# Patient Record
Sex: MALE | Race: WHITE | Employment: OTHER | URBAN - METROPOLITAN AREA
[De-identification: names, ages, dates, MRNs, and addresses within clinical notes are randomized per-mention and may not be internally consistent; named-entity substitution may affect disease eponyms.]

---

## 2020-04-02 ENCOUNTER — HOSPITAL ENCOUNTER (INPATIENT)
Age: 65
LOS: 7 days | Discharge: NURSING, REHAB OR LONG-TERM CARE | DRG: 897 | End: 2020-04-09
Attending: EMERGENCY MEDICINE | Admitting: INTERNAL MEDICINE
Payer: COMMERCIAL

## 2020-04-02 PROBLEM — E83.39 HYPOPHOSPHATEMIA: Status: ACTIVE | Noted: 2020-04-02

## 2020-04-02 PROBLEM — R25.1 TREMOR: Status: ACTIVE | Noted: 2020-04-02

## 2020-04-02 PROBLEM — F10.930 ALCOHOL WITHDRAWAL, UNCOMPLICATED (HCC): Status: ACTIVE | Noted: 2020-04-02

## 2020-04-02 PROBLEM — I10 ESSENTIAL HYPERTENSION: Status: ACTIVE | Noted: 2020-04-02

## 2020-04-02 LAB
A/G RATIO: 1.2 (ref 1.1–2.2)
ALBUMIN SERPL-MCNC: 4.3 G/DL (ref 3.4–5)
ALP BLD-CCNC: 44 U/L (ref 40–129)
ALT SERPL-CCNC: 24 U/L (ref 10–40)
ANION GAP SERPL CALCULATED.3IONS-SCNC: 18 MMOL/L (ref 3–16)
AST SERPL-CCNC: 34 U/L (ref 15–37)
BASOPHILS ABSOLUTE: 0 K/UL (ref 0–0.2)
BASOPHILS RELATIVE PERCENT: 0.2 %
BILIRUB SERPL-MCNC: 0.9 MG/DL (ref 0–1)
BUN BLDV-MCNC: 8 MG/DL (ref 7–20)
CALCIUM SERPL-MCNC: 9.4 MG/DL (ref 8.3–10.6)
CHLORIDE BLD-SCNC: 99 MMOL/L (ref 99–110)
CO2: 23 MMOL/L (ref 21–32)
CREAT SERPL-MCNC: <0.5 MG/DL (ref 0.8–1.3)
EOSINOPHILS ABSOLUTE: 0 K/UL (ref 0–0.6)
EOSINOPHILS RELATIVE PERCENT: 0.1 %
ETHANOL: 101 MG/DL (ref 0–0.08)
GFR AFRICAN AMERICAN: >60
GFR NON-AFRICAN AMERICAN: >60
GLOBULIN: 3.5 G/DL
GLUCOSE BLD-MCNC: 136 MG/DL (ref 70–99)
HCT VFR BLD CALC: 44.9 % (ref 40.5–52.5)
HEMOGLOBIN: 15.5 G/DL (ref 13.5–17.5)
INR BLD: 0.95 (ref 0.86–1.14)
LYMPHOCYTES ABSOLUTE: 1.9 K/UL (ref 1–5.1)
LYMPHOCYTES RELATIVE PERCENT: 26.6 %
MAGNESIUM: 2.1 MG/DL (ref 1.8–2.4)
MCH RBC QN AUTO: 35.4 PG (ref 26–34)
MCHC RBC AUTO-ENTMCNC: 34.5 G/DL (ref 31–36)
MCV RBC AUTO: 102.7 FL (ref 80–100)
MONOCYTES ABSOLUTE: 0.7 K/UL (ref 0–1.3)
MONOCYTES RELATIVE PERCENT: 9.2 %
NEUTROPHILS ABSOLUTE: 4.6 K/UL (ref 1.7–7.7)
NEUTROPHILS RELATIVE PERCENT: 63.9 %
PDW BLD-RTO: 13.8 % (ref 12.4–15.4)
PHOSPHORUS: 2.1 MG/DL (ref 2.5–4.9)
PLATELET # BLD: 136 K/UL (ref 135–450)
PMV BLD AUTO: 7.4 FL (ref 5–10.5)
POTASSIUM REFLEX MAGNESIUM: 3.8 MMOL/L (ref 3.5–5.1)
PROTHROMBIN TIME: 11 SEC (ref 10–13.2)
RBC # BLD: 4.37 M/UL (ref 4.2–5.9)
SODIUM BLD-SCNC: 140 MMOL/L (ref 136–145)
TOTAL PROTEIN: 7.8 G/DL (ref 6.4–8.2)
WBC # BLD: 7.3 K/UL (ref 4–11)

## 2020-04-02 PROCEDURE — 84100 ASSAY OF PHOSPHORUS: CPT

## 2020-04-02 PROCEDURE — 85025 COMPLETE CBC W/AUTO DIFF WBC: CPT

## 2020-04-02 PROCEDURE — 6360000002 HC RX W HCPCS: Performed by: NURSE PRACTITIONER

## 2020-04-02 PROCEDURE — 2580000003 HC RX 258: Performed by: INTERNAL MEDICINE

## 2020-04-02 PROCEDURE — 2500000003 HC RX 250 WO HCPCS: Performed by: NURSE PRACTITIONER

## 2020-04-02 PROCEDURE — 96365 THER/PROPH/DIAG IV INF INIT: CPT

## 2020-04-02 PROCEDURE — 6370000000 HC RX 637 (ALT 250 FOR IP): Performed by: INTERNAL MEDICINE

## 2020-04-02 PROCEDURE — 96366 THER/PROPH/DIAG IV INF ADDON: CPT

## 2020-04-02 PROCEDURE — 85610 PROTHROMBIN TIME: CPT

## 2020-04-02 PROCEDURE — 99285 EMERGENCY DEPT VISIT HI MDM: CPT

## 2020-04-02 PROCEDURE — 6360000002 HC RX W HCPCS: Performed by: EMERGENCY MEDICINE

## 2020-04-02 PROCEDURE — G0480 DRUG TEST DEF 1-7 CLASSES: HCPCS

## 2020-04-02 PROCEDURE — 6370000000 HC RX 637 (ALT 250 FOR IP): Performed by: NURSE PRACTITIONER

## 2020-04-02 PROCEDURE — 2580000003 HC RX 258: Performed by: NURSE PRACTITIONER

## 2020-04-02 PROCEDURE — 2060000000 HC ICU INTERMEDIATE R&B

## 2020-04-02 PROCEDURE — 2580000003 HC RX 258: Performed by: EMERGENCY MEDICINE

## 2020-04-02 PROCEDURE — 6370000000 HC RX 637 (ALT 250 FOR IP): Performed by: EMERGENCY MEDICINE

## 2020-04-02 PROCEDURE — 80053 COMPREHEN METABOLIC PANEL: CPT

## 2020-04-02 PROCEDURE — 83735 ASSAY OF MAGNESIUM: CPT

## 2020-04-02 RX ORDER — CHLORDIAZEPOXIDE HYDROCHLORIDE 25 MG/1
50 CAPSULE, GELATIN COATED ORAL 3 TIMES DAILY
Status: DISCONTINUED | OUTPATIENT
Start: 2020-04-02 | End: 2020-04-04

## 2020-04-02 RX ORDER — DIAZEPAM 5 MG/1
5 TABLET ORAL EVERY 4 HOURS PRN
Status: DISCONTINUED | OUTPATIENT
Start: 2020-04-02 | End: 2020-04-03

## 2020-04-02 RX ORDER — THIAMINE MONONITRATE (VIT B1) 100 MG
100 TABLET ORAL DAILY
Status: DISCONTINUED | OUTPATIENT
Start: 2020-04-03 | End: 2020-04-03 | Stop reason: DRUGHIGH

## 2020-04-02 RX ORDER — FOLIC ACID 1 MG/1
1 TABLET ORAL DAILY
Status: DISCONTINUED | OUTPATIENT
Start: 2020-04-03 | End: 2020-04-09 | Stop reason: HOSPADM

## 2020-04-02 RX ORDER — PROMETHAZINE HYDROCHLORIDE 25 MG/1
12.5 TABLET ORAL EVERY 6 HOURS PRN
Status: DISCONTINUED | OUTPATIENT
Start: 2020-04-02 | End: 2020-04-09 | Stop reason: HOSPADM

## 2020-04-02 RX ORDER — LISINOPRIL 20 MG/1
20 TABLET ORAL DAILY
Status: DISCONTINUED | OUTPATIENT
Start: 2020-04-03 | End: 2020-04-08

## 2020-04-02 RX ORDER — PHENOBARBITAL SODIUM 130 MG/ML
130 INJECTION INTRAMUSCULAR ONCE
Status: DISCONTINUED | OUTPATIENT
Start: 2020-04-02 | End: 2020-04-02 | Stop reason: SDUPTHER

## 2020-04-02 RX ORDER — ACETAMINOPHEN 325 MG/1
650 TABLET ORAL EVERY 6 HOURS PRN
Status: DISCONTINUED | OUTPATIENT
Start: 2020-04-02 | End: 2020-04-09 | Stop reason: HOSPADM

## 2020-04-02 RX ORDER — TRAZODONE HYDROCHLORIDE 50 MG/1
150 TABLET ORAL NIGHTLY
Status: DISCONTINUED | OUTPATIENT
Start: 2020-04-02 | End: 2020-04-09 | Stop reason: HOSPADM

## 2020-04-02 RX ORDER — SODIUM CHLORIDE 0.9 % (FLUSH) 0.9 %
10 SYRINGE (ML) INJECTION PRN
Status: DISCONTINUED | OUTPATIENT
Start: 2020-04-02 | End: 2020-04-02 | Stop reason: SDUPTHER

## 2020-04-02 RX ORDER — SODIUM CHLORIDE 0.9 % (FLUSH) 0.9 %
10 SYRINGE (ML) INJECTION EVERY 12 HOURS SCHEDULED
Status: DISCONTINUED | OUTPATIENT
Start: 2020-04-02 | End: 2020-04-09 | Stop reason: HOSPADM

## 2020-04-02 RX ORDER — ONDANSETRON 2 MG/ML
4 INJECTION INTRAMUSCULAR; INTRAVENOUS EVERY 6 HOURS PRN
Status: DISCONTINUED | OUTPATIENT
Start: 2020-04-02 | End: 2020-04-09 | Stop reason: HOSPADM

## 2020-04-02 RX ORDER — NICOTINE 21 MG/24HR
1 PATCH, TRANSDERMAL 24 HOURS TRANSDERMAL DAILY
Status: DISCONTINUED | OUTPATIENT
Start: 2020-04-02 | End: 2020-04-02 | Stop reason: CLARIF

## 2020-04-02 RX ORDER — SODIUM CHLORIDE 9 MG/ML
INJECTION, SOLUTION INTRAVENOUS CONTINUOUS
Status: ACTIVE | OUTPATIENT
Start: 2020-04-02 | End: 2020-04-03

## 2020-04-02 RX ORDER — ASPIRIN 81 MG/1
81 TABLET ORAL DAILY
Status: DISCONTINUED | OUTPATIENT
Start: 2020-04-03 | End: 2020-04-09 | Stop reason: HOSPADM

## 2020-04-02 RX ORDER — SODIUM CHLORIDE 0.9 % (FLUSH) 0.9 %
10 SYRINGE (ML) INJECTION PRN
Status: DISCONTINUED | OUTPATIENT
Start: 2020-04-02 | End: 2020-04-09 | Stop reason: HOSPADM

## 2020-04-02 RX ORDER — CLONIDINE HYDROCHLORIDE 0.1 MG/1
0.1 TABLET ORAL 2 TIMES DAILY
Status: DISCONTINUED | OUTPATIENT
Start: 2020-04-02 | End: 2020-04-07

## 2020-04-02 RX ORDER — ACETAMINOPHEN 650 MG/1
650 SUPPOSITORY RECTAL EVERY 6 HOURS PRN
Status: DISCONTINUED | OUTPATIENT
Start: 2020-04-02 | End: 2020-04-09 | Stop reason: HOSPADM

## 2020-04-02 RX ORDER — CHLORDIAZEPOXIDE HYDROCHLORIDE 25 MG/1
25 CAPSULE, GELATIN COATED ORAL ONCE
Status: COMPLETED | OUTPATIENT
Start: 2020-04-02 | End: 2020-04-02

## 2020-04-02 RX ORDER — SODIUM CHLORIDE 0.9 % (FLUSH) 0.9 %
10 SYRINGE (ML) INJECTION EVERY 12 HOURS SCHEDULED
Status: DISCONTINUED | OUTPATIENT
Start: 2020-04-02 | End: 2020-04-02 | Stop reason: SDUPTHER

## 2020-04-02 RX ORDER — LISINOPRIL 20 MG/1
20 TABLET ORAL DAILY
COMMUNITY

## 2020-04-02 RX ORDER — TRAZODONE HYDROCHLORIDE 150 MG/1
150 TABLET ORAL NIGHTLY
COMMUNITY

## 2020-04-02 RX ORDER — MULTIVITAMIN WITH FOLIC ACID 400 MCG
1 TABLET ORAL DAILY
Status: DISCONTINUED | OUTPATIENT
Start: 2020-04-03 | End: 2020-04-09 | Stop reason: HOSPADM

## 2020-04-02 RX ADMIN — CHLORDIAZEPOXIDE HYDROCHLORIDE 25 MG: 25 CAPSULE ORAL at 19:30

## 2020-04-02 RX ADMIN — POTASSIUM PHOSPHATE, MONOBASIC 250 MG: 500 TABLET, SOLUBLE ORAL at 21:38

## 2020-04-02 RX ADMIN — FOLIC ACID: 5 INJECTION, SOLUTION INTRAMUSCULAR; INTRAVENOUS; SUBCUTANEOUS at 17:39

## 2020-04-02 RX ADMIN — DIAZEPAM 5 MG: 5 TABLET ORAL at 21:33

## 2020-04-02 RX ADMIN — SODIUM CHLORIDE: 9 INJECTION, SOLUTION INTRAVENOUS at 21:01

## 2020-04-02 RX ADMIN — Medication 10 ML: at 21:01

## 2020-04-02 RX ADMIN — PROMETHAZINE HYDROCHLORIDE 12.5 MG: 25 TABLET ORAL at 21:33

## 2020-04-02 RX ADMIN — CLONIDINE HYDROCHLORIDE 0.1 MG: 0.1 TABLET ORAL at 21:33

## 2020-04-02 RX ADMIN — PHENOBARBITAL SODIUM 130 MG: 130 INJECTION INTRAMUSCULAR; INTRAVENOUS at 21:35

## 2020-04-02 RX ADMIN — CHLORDIAZEPOXIDE HYDROCHLORIDE 50 MG: 25 CAPSULE ORAL at 17:32

## 2020-04-02 RX ADMIN — TRAZODONE HYDROCHLORIDE 150 MG: 50 TABLET ORAL at 21:33

## 2020-04-02 ASSESSMENT — PAIN DESCRIPTION - INTENSITY: RATING_2: 6

## 2020-04-02 ASSESSMENT — PAIN SCALES - GENERAL
PAINLEVEL_OUTOF10: 9
PAINLEVEL_OUTOF10: 0
PAINLEVEL_OUTOF10: 0

## 2020-04-02 ASSESSMENT — PAIN DESCRIPTION - LOCATION
LOCATION: ABDOMEN
LOCATION_2: HEAD

## 2020-04-02 ASSESSMENT — PAIN DESCRIPTION - DESCRIPTORS: DESCRIPTORS: CRAMPING

## 2020-04-02 ASSESSMENT — PAIN DESCRIPTION - PAIN TYPE
TYPE: ACUTE PAIN
TYPE_2: ACUTE PAIN

## 2020-04-02 NOTE — ED NOTES
Pt to ER from home where he reports he's going through alcohol withdrawal, patient reporting his last drink was 830 am today, patient reports he drinks vodka or chardonnay.  Seizure pads placed on bed because patient reports having seizures in the past due to acute withdrawal.      oRmulo Estrella RN  04/02/20 6382

## 2020-04-02 NOTE — H&P
Provider, MD   traZODone (DESYREL) 150 MG tablet Take 150 mg by mouth nightly   Yes Historical Provider, MD       Allergies:  Ativan [lorazepam] and Morphine    Social History:  The patient currently lives alone    TOBACCO:   reports that he has never smoked. He has never used smokeless tobacco.  ETOH:   reports current alcohol use. Family History:  Reviewed in detail and negative for DM, Early CAD, Cancer, CVA. Positive as follows:    History reviewed. No pertinent family history. REVIEW OF SYSTEMS:   Positive for alcohol withdrawals with tremors, hypertension and as noted in the HPI. All other systems reviewed and negative. PHYSICAL EXAM:    BP (!) 148/81   Pulse 96   Temp 98.1 °F (36.7 °C) (Oral)   Resp 16   Ht 6' 4\" (1.93 m)   Wt 230 lb (104.3 kg)   SpO2 95%   BMI 28.00 kg/m²     General appearance: No apparent distress appears stated age and cooperative. HEENT Normal cephalic, atraumatic without obvious deformity. Pupils equal, round, and reactive to light. Extra ocular muscles intact. Conjunctivae/corneas clear. Neck: Supple, No jugular venous distention/bruits. Lungs: Clear to auscultation, bilaterally without Rales/Wheezes/Rhonchi with good respiratory effort. Heart: Regular rate and rhythm with Normal S1/S2 without murmurs, rubs or gallops, point of maximum impulse non-displaced  Abdomen: Soft, non-tender or non-distended without rigidity or guarding and positive bowel sounds all four quadrants. Extremities: No clubbing, cyanosis, or edema bilaterally. Full range of motion without deformity and normal gait intact. Skin: Skin color, texture, turgor normal.  No rashes or lesions. Neurologic: Alert and oriented X 3, neurovascularly intact with sensory/motor intact upper extremities/lower extremities, bilaterally. Cranial nerves: II-XII intact, grossly non-focal.  Asterixis  Mental status: Alert, oriented, thought content appropriate.         CBC   Recent Labs     04/02/20  1728

## 2020-04-02 NOTE — ED PROVIDER NOTES
CONSULT TO HOSPITALIST      EMERGENCY DEPARTMENT COURSE and DIFFERENTIAL DIAGNOSIS/MDM:   Vitals:    Vitals:    04/03/20 1041 04/03/20 1056 04/03/20 1106 04/03/20 1200   BP: 126/79 123/81 131/86 126/79   Pulse: 97 98 93 92   Resp: 16 16 16 18   Temp:    97.9 °F (36.6 °C)   TempSrc:    Oral   SpO2: 95% 95% 95% 96%   Weight:       Height:           Patient was given the following medications:  Medications   chlordiazePOXIDE (LIBRIUM) capsule 50 mg (50 mg Oral Given 4/3/20 0931)   lisinopril (PRINIVIL;ZESTRIL) tablet 20 mg (20 mg Oral Given 4/3/20 0923)   aspirin EC tablet 81 mg (81 mg Oral Given 4/3/20 0924)   traZODone (DESYREL) tablet 150 mg (150 mg Oral Given 4/2/20 2133)   sodium chloride flush 0.9 % injection 10 mL (10 mLs Intravenous Given 4/3/20 0923)   sodium chloride flush 0.9 % injection 10 mL (has no administration in time range)   acetaminophen (TYLENOL) tablet 650 mg (has no administration in time range)     Or   acetaminophen (TYLENOL) suppository 650 mg (has no administration in time range)   promethazine (PHENERGAN) tablet 12.5 mg (12.5 mg Oral Given 4/2/20 2133)     Or   ondansetron (ZOFRAN) injection 4 mg ( Intravenous See Alternative 4/2/20 2133)   enoxaparin (LOVENOX) injection 40 mg (40 mg Subcutaneous Given 6/2/75 4094)   folic acid (FOLVITE) tablet 1 mg (1 mg Oral Given 4/3/20 0923)   multivitamin 1 tablet (1 tablet Oral Given 4/3/20 0923)   0.9 % sodium chloride infusion ( Intravenous Canceled Entry 4/2/20 2133)   cloNIDine (CATAPRES) tablet 0.1 mg (0.1 mg Oral Given 4/3/20 0924)   potassium phosphate (monobasic) (K-PHOS) tablet 250 mg (250 mg Oral Given 4/3/20 0924)   diazePAM (VALIUM) tablet 10 mg (has no administration in time range)   thiamine (B-1) 300 mg in sodium chloride 0.9 % 100 mL IVPB (300 mg Intravenous New Bag 4/3/20 1227)     Followed by   vitamin B-1 (THIAMINE) tablet 100 mg (100 mg Oral Given 4/3/20 5748)   sodium chloride 0.9 % 3,478 mL with folic acid 1 mg, adult multi-vitamin with vitamin k 10 mL, thiamine 300 mg ( Intravenous Stopped 4/1956)   chlordiazePOXIDE (LIBRIUM) capsule 25 mg (25 mg Oral Given 4/2/20 1930)   PHENobarbital (LUMINAL) 130 mg in sodium chloride 0.9 % 100 mL IVPB (0 mg Intravenous Stopped 4/3/20 0039)   PHENobarbital (LUMINAL) 130 mg in sodium chloride 0.9 % 100 mL IVPB (0 mg Intravenous Stopped 4/3/20 1107)       Briefly, this is a 62year old male that  presents to the emergency department today with acute alcohol withdrawal.  The patient reports that he was sent over from [facility] for concern of alcohol withdrawal with history of withdrawal seizure. The patient reports he last drank this morning. States that he generally drinks 4 tall vodka sodas each day. He is tremulous, states that he is nauseated with some diarrhea body aches headache and increased sensorium. History of Ativan allergy, states it causes severe nausea with vomiting. He will be given Librium p.o. I did call pharmacy to dose this. Plan for admission for acute alcohol withdrawal.  Hospitalist will speak with attending physician once labs are resulted as his visit does exceed the length of my shift. FINAL IMPRESSION      1. Acute alcohol abuse, with unspecified complication ([address]    2. History of seizures    3. Hypophosphatemia          DISPOSITION/PLAN   DISPOSITION Admitted 04/02/2020 07:27:16 PM      PATIENT REFERREDTO:  No follow-up provider specified.     DISCHARGE MEDICATIONS:  Current Discharge Medication List          DISCONTINUED MEDICATIONS:  Current Discharge Medication List                 (Please note that portions of this note were completed with a voice recognition program.  Efforts were made to edit the dictations but occasionally words are mis-transcribed.)    NICANOR Hardy CNP (electronically signed)           NICANOR Hardy CNP  04/04/20 9115

## 2020-04-03 LAB
AMPHETAMINE SCREEN, URINE: ABNORMAL
ANION GAP SERPL CALCULATED.3IONS-SCNC: 10 MMOL/L (ref 3–16)
BARBITURATE SCREEN URINE: POSITIVE
BENZODIAZEPINE SCREEN, URINE: POSITIVE
BUN BLDV-MCNC: 8 MG/DL (ref 7–20)
CALCIUM SERPL-MCNC: 8.4 MG/DL (ref 8.3–10.6)
CANNABINOID SCREEN URINE: ABNORMAL
CHLORIDE BLD-SCNC: 105 MMOL/L (ref 99–110)
CO2: 25 MMOL/L (ref 21–32)
COCAINE METABOLITE SCREEN URINE: ABNORMAL
CREAT SERPL-MCNC: <0.5 MG/DL (ref 0.8–1.3)
GFR AFRICAN AMERICAN: >60
GFR NON-AFRICAN AMERICAN: >60
GLUCOSE BLD-MCNC: 98 MG/DL (ref 70–99)
HCT VFR BLD CALC: 39.7 % (ref 40.5–52.5)
HEMOGLOBIN: 13.7 G/DL (ref 13.5–17.5)
Lab: ABNORMAL
MAGNESIUM: 1.9 MG/DL (ref 1.8–2.4)
MCH RBC QN AUTO: 35.9 PG (ref 26–34)
MCHC RBC AUTO-ENTMCNC: 34.5 G/DL (ref 31–36)
MCV RBC AUTO: 104.1 FL (ref 80–100)
METHADONE SCREEN, URINE: ABNORMAL
OPIATE SCREEN URINE: ABNORMAL
OXYCODONE URINE: ABNORMAL
PDW BLD-RTO: 13.4 % (ref 12.4–15.4)
PH UA: 6.5
PHENCYCLIDINE SCREEN URINE: ABNORMAL
PHOSPHORUS: 3.4 MG/DL (ref 2.5–4.9)
PLATELET # BLD: 106 K/UL (ref 135–450)
PMV BLD AUTO: 7.4 FL (ref 5–10.5)
POTASSIUM SERPL-SCNC: 3.4 MMOL/L (ref 3.5–5.1)
PROPOXYPHENE SCREEN: ABNORMAL
RBC # BLD: 3.81 M/UL (ref 4.2–5.9)
SODIUM BLD-SCNC: 140 MMOL/L (ref 136–145)
TSH SERPL DL<=0.05 MIU/L-ACNC: 3.83 UIU/ML (ref 0.27–4.2)
WBC # BLD: 5.1 K/UL (ref 4–11)

## 2020-04-03 PROCEDURE — 85027 COMPLETE CBC AUTOMATED: CPT

## 2020-04-03 PROCEDURE — 6360000002 HC RX W HCPCS: Performed by: INTERNAL MEDICINE

## 2020-04-03 PROCEDURE — 2060000000 HC ICU INTERMEDIATE R&B

## 2020-04-03 PROCEDURE — 80307 DRUG TEST PRSMV CHEM ANLYZR: CPT

## 2020-04-03 PROCEDURE — 6370000000 HC RX 637 (ALT 250 FOR IP): Performed by: INTERNAL MEDICINE

## 2020-04-03 PROCEDURE — 83735 ASSAY OF MAGNESIUM: CPT

## 2020-04-03 PROCEDURE — 84100 ASSAY OF PHOSPHORUS: CPT

## 2020-04-03 PROCEDURE — 80048 BASIC METABOLIC PNL TOTAL CA: CPT

## 2020-04-03 PROCEDURE — 2580000003 HC RX 258: Performed by: INTERNAL MEDICINE

## 2020-04-03 PROCEDURE — 84443 ASSAY THYROID STIM HORMONE: CPT

## 2020-04-03 RX ORDER — DIAZEPAM 5 MG/1
10 TABLET ORAL EVERY 4 HOURS PRN
Status: DISCONTINUED | OUTPATIENT
Start: 2020-04-03 | End: 2020-04-04

## 2020-04-03 RX ORDER — THIAMINE MONONITRATE (VIT B1) 100 MG
100 TABLET ORAL DAILY
Status: DISCONTINUED | OUTPATIENT
Start: 2020-04-05 | End: 2020-04-09 | Stop reason: HOSPADM

## 2020-04-03 RX ADMIN — TRAZODONE HYDROCHLORIDE 150 MG: 50 TABLET ORAL at 23:41

## 2020-04-03 RX ADMIN — THERA TABS 1 TABLET: TAB at 09:23

## 2020-04-03 RX ADMIN — Medication 10 ML: at 09:23

## 2020-04-03 RX ADMIN — Medication 100 MG: at 09:24

## 2020-04-03 RX ADMIN — PHENOBARBITAL SODIUM 130 MG: 130 INJECTION INTRAMUSCULAR; INTRAVENOUS at 10:24

## 2020-04-03 RX ADMIN — Medication 10 ML: at 23:42

## 2020-04-03 RX ADMIN — ACETAMINOPHEN 650 MG: 325 TABLET, FILM COATED ORAL at 23:42

## 2020-04-03 RX ADMIN — CHLORDIAZEPOXIDE HYDROCHLORIDE 50 MG: 25 CAPSULE ORAL at 09:31

## 2020-04-03 RX ADMIN — POTASSIUM PHOSPHATE, MONOBASIC 250 MG: 500 TABLET, SOLUBLE ORAL at 09:24

## 2020-04-03 RX ADMIN — ASPIRIN 81 MG: 81 TABLET, COATED ORAL at 09:24

## 2020-04-03 RX ADMIN — CLONIDINE HYDROCHLORIDE 0.1 MG: 0.1 TABLET ORAL at 23:41

## 2020-04-03 RX ADMIN — DIAZEPAM 10 MG: 5 TABLET ORAL at 16:50

## 2020-04-03 RX ADMIN — POTASSIUM PHOSPHATE, MONOBASIC 250 MG: 500 TABLET, SOLUBLE ORAL at 13:48

## 2020-04-03 RX ADMIN — CHLORDIAZEPOXIDE HYDROCHLORIDE 50 MG: 25 CAPSULE ORAL at 18:14

## 2020-04-03 RX ADMIN — LISINOPRIL 20 MG: 20 TABLET ORAL at 09:23

## 2020-04-03 RX ADMIN — ACETAMINOPHEN 650 MG: 325 TABLET, FILM COATED ORAL at 16:50

## 2020-04-03 RX ADMIN — FOLIC ACID 1 MG: 1 TABLET ORAL at 09:23

## 2020-04-03 RX ADMIN — CHLORDIAZEPOXIDE HYDROCHLORIDE 50 MG: 25 CAPSULE ORAL at 01:33

## 2020-04-03 RX ADMIN — ENOXAPARIN SODIUM 40 MG: 40 INJECTION SUBCUTANEOUS at 09:23

## 2020-04-03 RX ADMIN — THIAMINE HYDROCHLORIDE 300 MG: 100 INJECTION, SOLUTION INTRAMUSCULAR; INTRAVENOUS at 12:21

## 2020-04-03 RX ADMIN — DIAZEPAM 10 MG: 5 TABLET ORAL at 23:41

## 2020-04-03 RX ADMIN — CLONIDINE HYDROCHLORIDE 0.1 MG: 0.1 TABLET ORAL at 09:24

## 2020-04-03 ASSESSMENT — PAIN DESCRIPTION - DESCRIPTORS
DESCRIPTORS: HEADACHE
DESCRIPTORS_2: HEADACHE
DESCRIPTORS: HEADACHE

## 2020-04-03 ASSESSMENT — PAIN DESCRIPTION - LOCATION
LOCATION: HEAD
LOCATION: ABDOMEN
LOCATION: HEAD
LOCATION_2: HEAD

## 2020-04-03 ASSESSMENT — PAIN DESCRIPTION - INTENSITY
RATING_2: 0
RATING_2: 7

## 2020-04-03 ASSESSMENT — PAIN SCALES - GENERAL
PAINLEVEL_OUTOF10: 6
PAINLEVEL_OUTOF10: 7
PAINLEVEL_OUTOF10: 0
PAINLEVEL_OUTOF10: 0
PAINLEVEL_OUTOF10: 4

## 2020-04-03 ASSESSMENT — PAIN DESCRIPTION - PROGRESSION: CLINICAL_PROGRESSION_2: RESOLVED

## 2020-04-03 ASSESSMENT — PAIN DESCRIPTION - PAIN TYPE
TYPE: ACUTE PAIN
TYPE: ACUTE PAIN
TYPE_2: ACUTE PAIN

## 2020-04-03 NOTE — PLAN OF CARE
0900 Assessment complete, see flow sheet. CIWA score 13, will be getting phenobarbital IVPB when available from pharmacy. To bathroom & back to bed with contact guard assistance of 1, continent large loose bowel movement, incontinent smear bowel movement. Denies pain, no complaints voiced. No blood return on saline lock placement check, unable to flush. Iv removed, unable to restart iv on first attempt. Charge RN notified. Bed in lowest position, wheels locked, padded side rails up times 3, bed alarm in place, door open, encouraged to use call light for needs, phone and call light are within reach. Will continue to monitor.   Walt Jackman RN, BSN

## 2020-04-03 NOTE — ED PROVIDER NOTES
source Oral, resp. rate 18, height 6' 4\" (1.93 m), weight 242 lb 14.4 oz (110.2 kg), SpO2 96 %. For further details of [facility] emergency department encounter, please see documentation by advanced practice provider, Dominic Pinto NP.     Hernán Rooney DO (electronically signed)  Attending Emergency Physician       Hernán Rooney DO  04/03/20 7950

## 2020-04-03 NOTE — PROGRESS NOTES
Admission completed see flowchart. CIWA score of 12. Valium given. Pt is very shaky and tremor. Patient encouraged to use call light with any needs. Patient states understanding, call light in reach, bed alarm on. Will continue to monitor.

## 2020-04-03 NOTE — PROGRESS NOTES
Prophylaxis: Subcut enoxaparin  Diet: DIET GENERAL;  Code Status: Full Code    Dispo: Anticipate discharge in the next 72-96 hrs    ____________________________________________________________________________    Subjective:   Overnight Events:   Uneventful overnight. Reports better symptom tolerability with phenobarbital.      Physical Exam Performed:  /82   Pulse 81   Temp 98 °F (36.7 °C) (Oral)   Resp 16   Ht 6' 4\" (1.93 m)   Wt 242 lb 14.4 oz (110.2 kg)   SpO2 95%   BMI 29.57 kg/m²   General appearance: No apparent distress, appears stated age and cooperative. HEENT: Normocephalic, atraumatic, MMM, No sclera icterus/conjuctival palor  Neck: Supple, no thyromegally. No jugular venous distention. Respiratory:  Normal respiratory effort. Clear to auscultation, no Rales/Wheezes/Rhonchi. Cardiovascular: S1/S2 without murmurs, rubs or gallops. RRR  Abdomen: Soft, non-tender, non-distended, bowel sounds present. Musculoskeletal: No clubbing, cyanosis or edema bilaterally. Skin: Skin color, texture, turgor normal.  No rashes or lesions. Neurologic:  Cranial nerves: II-XII intact, SULY, No focal sensory/motor deficits. Still with asterixis  Psychiatric: Alert and oriented, thought content appropriate  Capillary Refill: Brisk,< 3 seconds   Peripheral Pulses: +2 palpable, equal bilaterally     No intake or output data in the 24 hours ending 04/03/20 0823    Labs:   Recent Labs     04/02/20 1728 04/03/20  0514   WBC 7.3 5.1   HGB 15.5 13.7   HCT 44.9 39.7*    106*      Recent Labs     04/02/20  1728 04/03/20  0513    140   K 3.8 3.4*   CL 99 105   CO2 23 25   BUN 8 8   CREATININE <0.5* <0.5*   CALCIUM 9.4 8.4   PHOS 2.1* 3.4   AST 34  --    ALT 24  --    BILITOT 0.9  --    ALKPHOS 44  --      No results for input(s): CKTOTAL, TROPONINI in the last 72 hours.     Urinalysis:  No results found for: Yony Clear, BACTERIA, RBCUA, BLOODU, [address], [address]    Radiology:  No orders to display

## 2020-04-04 PROCEDURE — 2580000003 HC RX 258: Performed by: INTERNAL MEDICINE

## 2020-04-04 PROCEDURE — 6370000000 HC RX 637 (ALT 250 FOR IP): Performed by: INTERNAL MEDICINE

## 2020-04-04 PROCEDURE — 6360000002 HC RX W HCPCS: Performed by: INTERNAL MEDICINE

## 2020-04-04 PROCEDURE — 2060000000 HC ICU INTERMEDIATE R&B

## 2020-04-04 RX ORDER — DIAZEPAM 5 MG/1
10 TABLET ORAL EVERY 4 HOURS PRN
Status: DISCONTINUED | OUTPATIENT
Start: 2020-04-04 | End: 2020-04-06

## 2020-04-04 RX ORDER — CHLORDIAZEPOXIDE HYDROCHLORIDE 25 MG/1
50 CAPSULE, GELATIN COATED ORAL 4 TIMES DAILY
Status: DISCONTINUED | OUTPATIENT
Start: 2020-04-04 | End: 2020-04-06

## 2020-04-04 RX ORDER — CHLORDIAZEPOXIDE HYDROCHLORIDE 25 MG/1
50 CAPSULE, GELATIN COATED ORAL 4 TIMES DAILY
Status: CANCELLED | OUTPATIENT
Start: 2020-04-04

## 2020-04-04 RX ADMIN — CHLORDIAZEPOXIDE HYDROCHLORIDE 50 MG: 25 CAPSULE ORAL at 13:12

## 2020-04-04 RX ADMIN — DIAZEPAM 10 MG: 5 TABLET ORAL at 20:03

## 2020-04-04 RX ADMIN — CLONIDINE HYDROCHLORIDE 0.1 MG: 0.1 TABLET ORAL at 22:33

## 2020-04-04 RX ADMIN — CLONIDINE HYDROCHLORIDE 0.1 MG: 0.1 TABLET ORAL at 09:36

## 2020-04-04 RX ADMIN — ACETAMINOPHEN 650 MG: 325 TABLET, FILM COATED ORAL at 22:32

## 2020-04-04 RX ADMIN — ACETAMINOPHEN 650 MG: 325 TABLET, FILM COATED ORAL at 10:48

## 2020-04-04 RX ADMIN — FOLIC ACID 1 MG: 1 TABLET ORAL at 09:37

## 2020-04-04 RX ADMIN — DIAZEPAM 10 MG: 5 TABLET ORAL at 07:53

## 2020-04-04 RX ADMIN — CHLORDIAZEPOXIDE HYDROCHLORIDE 50 MG: 25 CAPSULE ORAL at 09:36

## 2020-04-04 RX ADMIN — DIAZEPAM 10 MG: 5 TABLET ORAL at 15:11

## 2020-04-04 RX ADMIN — CHLORDIAZEPOXIDE HYDROCHLORIDE 50 MG: 25 CAPSULE ORAL at 03:59

## 2020-04-04 RX ADMIN — CHLORDIAZEPOXIDE HYDROCHLORIDE 50 MG: 25 CAPSULE ORAL at 18:17

## 2020-04-04 RX ADMIN — THERA TABS 1 TABLET: TAB at 09:37

## 2020-04-04 RX ADMIN — TRAZODONE HYDROCHLORIDE 150 MG: 50 TABLET ORAL at 22:32

## 2020-04-04 RX ADMIN — ASPIRIN 81 MG: 81 TABLET, COATED ORAL at 09:36

## 2020-04-04 RX ADMIN — Medication 10 ML: at 11:09

## 2020-04-04 RX ADMIN — CHLORDIAZEPOXIDE HYDROCHLORIDE 50 MG: 25 CAPSULE ORAL at 22:32

## 2020-04-04 RX ADMIN — LISINOPRIL 20 MG: 20 TABLET ORAL at 09:36

## 2020-04-04 RX ADMIN — Medication 10 ML: at 22:36

## 2020-04-04 RX ADMIN — PHENOBARBITAL SODIUM 130 MG: 130 INJECTION INTRAMUSCULAR; INTRAVENOUS at 10:39

## 2020-04-04 RX ADMIN — THIAMINE HYDROCHLORIDE 300 MG: 100 INJECTION, SOLUTION INTRAMUSCULAR; INTRAVENOUS at 11:09

## 2020-04-04 ASSESSMENT — PAIN DESCRIPTION - DESCRIPTORS
DESCRIPTORS: HEADACHE
DESCRIPTORS: HEADACHE

## 2020-04-04 ASSESSMENT — ENCOUNTER SYMPTOMS
ABDOMINAL PAIN: 0
SHORTNESS OF BREATH: 0
CHEST TIGHTNESS: 0
DIARRHEA: 1
NAUSEA: 1
VOMITING: 0

## 2020-04-04 ASSESSMENT — PAIN DESCRIPTION - PAIN TYPE
TYPE: ACUTE PAIN
TYPE: ACUTE PAIN

## 2020-04-04 ASSESSMENT — PAIN DESCRIPTION - LOCATION
LOCATION: HEAD
LOCATION: HEAD

## 2020-04-04 ASSESSMENT — PAIN SCALES - GENERAL
PAINLEVEL_OUTOF10: 3
PAINLEVEL_OUTOF10: 2
PAINLEVEL_OUTOF10: 0

## 2020-04-04 NOTE — PROGRESS NOTES
Hospitalist Progress Note      PCP: No primary care provider on file. Date of Admission: 4/2/2020    LOS: 2    Chief Complaint:   Chief Complaint   Patient presents with    Withdrawal     pt was at abuse center and noted to have hypertension and withdrawl symtoms, pt states seizures in past from withdrawl, last drank 9 am. pt states headache and abdominal pain. Case Summary:   72-year-old gentleman with history of alcohol abuse admitted for alcohol withdrawals      Active Hospital Problems    Diagnosis Date Noted    Alcohol withdrawal, uncomplicated ([facility] [address] [U88.545] 04/02/2020    Essential hypertension [I10] 04/02/2020    Tremor [R25.1] 04/02/2020    Hypophosphatemia [E83.39] 04/02/2020         Principal Problem:    Alcohol withdrawal, uncomplicated (HCC) with tremors: Still with some tremulous this morning. Denies hallucinations. Gets relief with valium and pheno  -We will continue CIWA protocol PRN Valium and scheduled Librium  -given ongoing withdrawals with tremors despite high doses on librium with valium, will give another dose of phenobarbital this morning. Discussed with the RN. -Monitor vital signs    Active Problems:    Essential hypertension: Improved blood pressure control. Continue clonidine, lisinopril    Hypophosphatemia: Repleted.    stable        Medications:  Reviewed  Infusion Medications   Scheduled Medications    thiamine (VITAMIN B1) IVPB  300 mg Intravenous Q24H    Followed by   Conor Fountain ON 4/5/2020] vitamin B-1  100 mg Oral Daily    chlordiazePOXIDE  50 mg Oral TID    lisinopril  20 mg Oral Daily    aspirin  81 mg Oral Daily    traZODone  150 mg Oral Nightly    sodium chloride flush  10 mL Intravenous 2 times per day    enoxaparin  40 mg Subcutaneous Daily    folic acid  1 mg Oral Daily    multivitamin  1 tablet Oral Daily    cloNIDine  0.1 mg Oral BID     PRN Meds: diazePAM, sodium chloride flush, acetaminophen **OR** acetaminophen, promethazine **OR** ondansetron      DVT Prophylaxis: Subcut enoxaparin  Diet: DIET GENERAL;  Code Status: Full Code    Dispo: Anticipate discharge in the next 72-96 hrs    ____________________________________________________________________________    Subjective:   Overnight Events:   Uneventful overnight. Reports better symptom tolerability with phenobarbital.      Physical Exam Performed:  BP (!) 150/85   Pulse 103   Temp 97.5 °F (36.4 °C) (Oral)   Resp 16   Ht 6' 4\" (1.93 m)   Wt 242 lb 14.4 oz (110.2 kg)   SpO2 97%   BMI 29.57 kg/m²   General appearance: No apparent distress, appears stated age and cooperative. HEENT: Normocephalic, atraumatic, MMM, No sclera icterus/conjuctival palor  Neck: Supple, no thyromegally. No jugular venous distention. Respiratory:  Normal respiratory effort. Clear to auscultation, no Rales/Wheezes/Rhonchi. Cardiovascular: S1/S2 without murmurs, rubs or gallops. RRR  Abdomen: Soft, non-tender, non-distended, bowel sounds present. Musculoskeletal: No clubbing, cyanosis or edema bilaterally. Skin: Skin color, texture, turgor normal.  No rashes or lesions. Neurologic:  Cranial nerves: II-XII intact, SULY, No focal sensory/motor deficits.   Still with asterixis  Psychiatric: Alert and oriented, thought content appropriate  Capillary Refill: Brisk,< 3 seconds   Peripheral Pulses: +2 palpable, equal bilaterally       Intake/Output Summary (Last 24 hours) at 4/4/2020 0825  Last data filed at 4/3/2020 1800  Gross per 24 hour   Intake 1460 ml   Output 475 ml   Net 985 ml       Labs:   Recent Labs     04/02/20 1728 04/03/20  0514   WBC 7.3 5.1   HGB 15.5 13.7   HCT 44.9 39.7*    106*      Recent Labs     04/02/20  1728 04/03/20  0513    140   K 3.8 3.4*   CL 99 105   CO2 23 25   BUN 8 8   CREATININE <0.5* <0.5*   CALCIUM 9.4 8.4   PHOS 2.1* 3.4   AST 34  --    ALT 24  --    BILITOT 0.9  --    ALKPHOS 44  --      No results for input(s): Lissette Healy in the last 72

## 2020-04-05 LAB
ANION GAP SERPL CALCULATED.3IONS-SCNC: 12 MMOL/L (ref 3–16)
BASOPHILS ABSOLUTE: 0 K/UL (ref 0–0.2)
BASOPHILS RELATIVE PERCENT: 0.2 %
BUN BLDV-MCNC: 9 MG/DL (ref 7–20)
CALCIUM SERPL-MCNC: 8.9 MG/DL (ref 8.3–10.6)
CHLORIDE BLD-SCNC: 105 MMOL/L (ref 99–110)
CO2: 22 MMOL/L (ref 21–32)
CREAT SERPL-MCNC: <0.5 MG/DL (ref 0.8–1.3)
EOSINOPHILS ABSOLUTE: 0.1 K/UL (ref 0–0.6)
EOSINOPHILS RELATIVE PERCENT: 2.2 %
GFR AFRICAN AMERICAN: >60
GFR NON-AFRICAN AMERICAN: >60
GLUCOSE BLD-MCNC: 93 MG/DL (ref 70–99)
HCT VFR BLD CALC: 41 % (ref 40.5–52.5)
HEMOGLOBIN: 14 G/DL (ref 13.5–17.5)
LYMPHOCYTES ABSOLUTE: 2.1 K/UL (ref 1–5.1)
LYMPHOCYTES RELATIVE PERCENT: 43.2 %
MCH RBC QN AUTO: 35.5 PG (ref 26–34)
MCHC RBC AUTO-ENTMCNC: 34.1 G/DL (ref 31–36)
MCV RBC AUTO: 104 FL (ref 80–100)
MONOCYTES ABSOLUTE: 0.4 K/UL (ref 0–1.3)
MONOCYTES RELATIVE PERCENT: 7.5 %
NEUTROPHILS ABSOLUTE: 2.3 K/UL (ref 1.7–7.7)
NEUTROPHILS RELATIVE PERCENT: 46.9 %
PDW BLD-RTO: 13.5 % (ref 12.4–15.4)
PLATELET # BLD: 102 K/UL (ref 135–450)
PMV BLD AUTO: 8.4 FL (ref 5–10.5)
POTASSIUM SERPL-SCNC: 3.7 MMOL/L (ref 3.5–5.1)
RBC # BLD: 3.94 M/UL (ref 4.2–5.9)
SODIUM BLD-SCNC: 139 MMOL/L (ref 136–145)
WBC # BLD: 4.9 K/UL (ref 4–11)

## 2020-04-05 PROCEDURE — 6360000002 HC RX W HCPCS: Performed by: INTERNAL MEDICINE

## 2020-04-05 PROCEDURE — 6370000000 HC RX 637 (ALT 250 FOR IP): Performed by: INTERNAL MEDICINE

## 2020-04-05 PROCEDURE — 36415 COLL VENOUS BLD VENIPUNCTURE: CPT

## 2020-04-05 PROCEDURE — 2060000000 HC ICU INTERMEDIATE R&B

## 2020-04-05 PROCEDURE — 85025 COMPLETE CBC W/AUTO DIFF WBC: CPT

## 2020-04-05 PROCEDURE — 80048 BASIC METABOLIC PNL TOTAL CA: CPT

## 2020-04-05 PROCEDURE — 2580000003 HC RX 258: Performed by: INTERNAL MEDICINE

## 2020-04-05 RX ORDER — PHENOBARBITAL SODIUM 130 MG/ML
130 INJECTION INTRAMUSCULAR ONCE
Status: COMPLETED | OUTPATIENT
Start: 2020-04-05 | End: 2020-04-05

## 2020-04-05 RX ADMIN — DIAZEPAM 10 MG: 5 TABLET ORAL at 13:09

## 2020-04-05 RX ADMIN — LISINOPRIL 20 MG: 20 TABLET ORAL at 09:59

## 2020-04-05 RX ADMIN — TRAZODONE HYDROCHLORIDE 150 MG: 50 TABLET ORAL at 21:30

## 2020-04-05 RX ADMIN — ASPIRIN 81 MG: 81 TABLET, COATED ORAL at 09:59

## 2020-04-05 RX ADMIN — ENOXAPARIN SODIUM 40 MG: 40 INJECTION SUBCUTANEOUS at 10:00

## 2020-04-05 RX ADMIN — Medication 10 ML: at 21:29

## 2020-04-05 RX ADMIN — Medication 10 ML: at 10:03

## 2020-04-05 RX ADMIN — Medication 100 MG: at 10:11

## 2020-04-05 RX ADMIN — DIAZEPAM 10 MG: 5 TABLET ORAL at 21:28

## 2020-04-05 RX ADMIN — DIAZEPAM 10 MG: 5 TABLET ORAL at 08:03

## 2020-04-05 RX ADMIN — ACETAMINOPHEN 650 MG: 325 TABLET, FILM COATED ORAL at 21:31

## 2020-04-05 RX ADMIN — PHENOBARBITAL SODIUM 130 MG: 130 INJECTION INTRAMUSCULAR; INTRAVENOUS at 17:52

## 2020-04-05 RX ADMIN — THERA TABS 1 TABLET: TAB at 09:59

## 2020-04-05 RX ADMIN — CHLORDIAZEPOXIDE HYDROCHLORIDE 50 MG: 25 CAPSULE ORAL at 10:02

## 2020-04-05 RX ADMIN — CHLORDIAZEPOXIDE HYDROCHLORIDE 50 MG: 25 CAPSULE ORAL at 21:30

## 2020-04-05 RX ADMIN — CHLORDIAZEPOXIDE HYDROCHLORIDE 50 MG: 25 CAPSULE ORAL at 14:19

## 2020-04-05 RX ADMIN — CLONIDINE HYDROCHLORIDE 0.1 MG: 0.1 TABLET ORAL at 09:59

## 2020-04-05 RX ADMIN — FOLIC ACID 1 MG: 1 TABLET ORAL at 09:59

## 2020-04-05 RX ADMIN — CLONIDINE HYDROCHLORIDE 0.1 MG: 0.1 TABLET ORAL at 21:29

## 2020-04-05 ASSESSMENT — PAIN DESCRIPTION - LOCATION
LOCATION: HEAD
LOCATION: HEAD
LOCATION_2: ABDOMEN

## 2020-04-05 ASSESSMENT — PAIN DESCRIPTION - DESCRIPTORS: DESCRIPTORS: HEADACHE

## 2020-04-05 ASSESSMENT — PAIN SCALES - GENERAL
PAINLEVEL_OUTOF10: 3
PAINLEVEL_OUTOF10: 4
PAINLEVEL_OUTOF10: 3

## 2020-04-05 ASSESSMENT — PAIN DESCRIPTION - PAIN TYPE
TYPE: ACUTE PAIN
TYPE: ACUTE PAIN

## 2020-04-06 PROCEDURE — 2580000003 HC RX 258: Performed by: INTERNAL MEDICINE

## 2020-04-06 PROCEDURE — 6370000000 HC RX 637 (ALT 250 FOR IP): Performed by: HOSPITALIST

## 2020-04-06 PROCEDURE — 2060000000 HC ICU INTERMEDIATE R&B

## 2020-04-06 PROCEDURE — 6360000002 HC RX W HCPCS: Performed by: INTERNAL MEDICINE

## 2020-04-06 PROCEDURE — 6370000000 HC RX 637 (ALT 250 FOR IP): Performed by: INTERNAL MEDICINE

## 2020-04-06 RX ORDER — CHLORDIAZEPOXIDE HYDROCHLORIDE 25 MG/1
25 CAPSULE, GELATIN COATED ORAL 3 TIMES DAILY PRN
Status: DISCONTINUED | OUTPATIENT
Start: 2020-04-06 | End: 2020-04-07

## 2020-04-06 RX ORDER — DIAZEPAM 5 MG/1
5 TABLET ORAL EVERY 6 HOURS PRN
Status: DISCONTINUED | OUTPATIENT
Start: 2020-04-06 | End: 2020-04-08

## 2020-04-06 RX ORDER — LOPERAMIDE HYDROCHLORIDE 2 MG/1
4 CAPSULE ORAL EVERY 6 HOURS PRN
Status: DISCONTINUED | OUTPATIENT
Start: 2020-04-06 | End: 2020-04-09 | Stop reason: HOSPADM

## 2020-04-06 RX ORDER — DIAZEPAM 5 MG/1
10 TABLET ORAL EVERY 6 HOURS PRN
Status: DISCONTINUED | OUTPATIENT
Start: 2020-04-06 | End: 2020-04-06

## 2020-04-06 RX ADMIN — DIAZEPAM 10 MG: 5 TABLET ORAL at 11:01

## 2020-04-06 RX ADMIN — ASPIRIN 81 MG: 81 TABLET, COATED ORAL at 08:04

## 2020-04-06 RX ADMIN — LOPERAMIDE HYDROCHLORIDE 4 MG: 2 CAPSULE ORAL at 11:16

## 2020-04-06 RX ADMIN — ENOXAPARIN SODIUM 40 MG: 40 INJECTION SUBCUTANEOUS at 08:03

## 2020-04-06 RX ADMIN — Medication 10 ML: at 21:40

## 2020-04-06 RX ADMIN — TRAZODONE HYDROCHLORIDE 150 MG: 50 TABLET ORAL at 21:39

## 2020-04-06 RX ADMIN — CHLORDIAZEPOXIDE HYDROCHLORIDE 50 MG: 25 CAPSULE ORAL at 08:03

## 2020-04-06 RX ADMIN — THERA TABS 1 TABLET: TAB at 08:04

## 2020-04-06 RX ADMIN — CHLORDIAZEPOXIDE HYDROCHLORIDE 25 MG: 25 CAPSULE ORAL at 21:39

## 2020-04-06 RX ADMIN — Medication 100 MG: at 08:04

## 2020-04-06 RX ADMIN — CLONIDINE HYDROCHLORIDE 0.1 MG: 0.1 TABLET ORAL at 08:04

## 2020-04-06 RX ADMIN — ACETAMINOPHEN 650 MG: 325 TABLET, FILM COATED ORAL at 06:12

## 2020-04-06 RX ADMIN — CLONIDINE HYDROCHLORIDE 0.1 MG: 0.1 TABLET ORAL at 21:38

## 2020-04-06 RX ADMIN — LISINOPRIL 20 MG: 20 TABLET ORAL at 08:03

## 2020-04-06 RX ADMIN — Medication 10 ML: at 08:04

## 2020-04-06 RX ADMIN — FOLIC ACID 1 MG: 1 TABLET ORAL at 08:03

## 2020-04-06 RX ADMIN — CHLORDIAZEPOXIDE HYDROCHLORIDE 50 MG: 25 CAPSULE ORAL at 13:00

## 2020-04-06 RX ADMIN — DIAZEPAM 5 MG: 5 TABLET ORAL at 21:39

## 2020-04-06 RX ADMIN — DIAZEPAM 10 MG: 5 TABLET ORAL at 06:12

## 2020-04-06 ASSESSMENT — PAIN SCALES - GENERAL
PAINLEVEL_OUTOF10: 0
PAINLEVEL_OUTOF10: 0
PAINLEVEL_OUTOF10: 3
PAINLEVEL_OUTOF10: 2
PAINLEVEL_OUTOF10: 5
PAINLEVEL_OUTOF10: 0
PAINLEVEL_OUTOF10: 2

## 2020-04-06 ASSESSMENT — PAIN DESCRIPTION - LOCATION
LOCATION: HEAD
LOCATION_2: ABDOMEN
LOCATION: HEAD;ABDOMEN
LOCATION: HEAD

## 2020-04-06 ASSESSMENT — PAIN DESCRIPTION - PAIN TYPE
TYPE: ACUTE PAIN
TYPE: ACUTE PAIN
TYPE_2: ACUTE PAIN
TYPE: CHRONIC PAIN
TYPE: ACUTE PAIN

## 2020-04-06 ASSESSMENT — PAIN DESCRIPTION - INTENSITY: RATING_2: 0

## 2020-04-06 ASSESSMENT — PAIN DESCRIPTION - DESCRIPTORS
DESCRIPTORS: HEADACHE
DESCRIPTORS_2: HEADACHE

## 2020-04-06 NOTE — PROGRESS NOTES
Hospitalist Progress Note      PCP: No primary care provider on file. Date of Admission: 4/2/2020    LOS: 4    Chief Complaint:   Chief Complaint   Patient presents with    Withdrawal     pt was at abuse center and noted to have hypertension and withdrawl symtoms, pt states seizures in past from withdrawl, last drank 9 am. pt states headache and abdominal pain. Case Summary:   25-year-old gentleman with history of alcohol abuse admitted for alcohol withdrawals      Active Hospital Problems    Diagnosis Date Noted    Alcohol withdrawal, uncomplicated ([address] [B26.668] 04/02/2020    Essential hypertension [I10] 04/02/2020    Tremor [R25.1] 04/02/2020    Hypophosphatemia [E83.39] 04/02/2020         Principal Problem:    Alcohol withdrawal, uncomplicated (HCC) with tremors  -Is improving. He has been weaned off of phenobarb  -Continue Valium and Librium at this point we will try to wean off those  -Still having tremors and symptoms will be slow wean  -Discussed with patient the goal is to get him off of the PRN Valium and probably wean down Librium hopefully by tomorrow  -We will need inpatient treatment afterwards:   Not ready for discharge today    Active Problems:    Essential hypertension: Improved blood pressure control. Continue clonidine, lisinopril    Hypophosphatemia: Repleted.    stable        Medications:  Reviewed  Infusion Medications   Scheduled Medications    chlordiazePOXIDE  50 mg Oral 4x Daily    vitamin B-1  100 mg Oral Daily    lisinopril  20 mg Oral Daily    aspirin  81 mg Oral Daily    traZODone  150 mg Oral Nightly    sodium chloride flush  10 mL Intravenous 2 times per day    enoxaparin  40 mg Subcutaneous Daily    folic acid  1 mg Oral Daily    multivitamin  1 tablet Oral Daily    cloNIDine  0.1 mg Oral BID     PRN Meds: diazePAM, loperamide, sodium chloride flush, acetaminophen **OR** acetaminophen, promethazine **OR** ondansetron      DVT Prophylaxis: Subcut enoxaparin  Diet: DIET GENERAL;  Code Status: Full Code    Dispo: Anticipate discharge in the next 96 hrs    ____________________________________________________________________________    Subjective:   Overnight Events:   Uneventful overnight. Reports improved symptoms with mild anxiety this morning      Physical Exam Performed:  BP 94/69   Pulse 83   Temp 97.5 °F (36.4 °C) (Axillary)   Resp 18   Ht 6' 4\" (1.93 m)   Wt 243 lb 3.2 oz (110.3 kg)   SpO2 96%   BMI 29.60 kg/m²   General appearance: No apparent distress, appears stated age and cooperative. HEENT: Normocephalic, atraumatic, MMM, No sclera icterus/conjuctival palor  Neck: Supple, no thyromegally. No jugular venous distention. Respiratory:  Normal respiratory effort. Clear to auscultation, no Rales/Wheezes/Rhonchi. Cardiovascular: S1/S2 without murmurs, rubs or gallops. RRR  Abdomen: Soft, non-tender, non-distended, bowel sounds present. Musculoskeletal: No clubbing, cyanosis or edema bilaterally. Skin: Skin color, texture, turgor normal.  No rashes or lesions. Neurologic:  Cranial nerves: II-XII intact, SULY, No focal sensory/motor deficits. Still with mild to moderate asterixis  Psychiatric: Alert and oriented, thought content appropriate  Capillary Refill: Brisk,< 3 seconds   Peripheral Pulses: +2 palpable, equal bilaterally     No intake or output data in the 24 hours ending 04/06/20 1108    Labs:   Recent Labs     04/05/20  0457   WBC 4.9   HGB 14.0   HCT 41.0   *      Recent Labs     04/05/20  0457      K 3.7      CO2 22   BUN 9   CREATININE <0.5*   CALCIUM 8.9     No results for input(s): Shelbie Apa in the last 72 hours. Urinalysis:  No results found for: Karyna Kam, BACTERIA, RBCUA, BLOODU, [address], [address]    Radiology:  No orders to display           Manjula Yu MD      Please excuse brevity and/or typos. This report was transcribed using voice recognition software.  Every effort was made to

## 2020-04-07 PROCEDURE — 6370000000 HC RX 637 (ALT 250 FOR IP): Performed by: HOSPITALIST

## 2020-04-07 PROCEDURE — 2060000000 HC ICU INTERMEDIATE R&B

## 2020-04-07 PROCEDURE — 6370000000 HC RX 637 (ALT 250 FOR IP): Performed by: INTERNAL MEDICINE

## 2020-04-07 PROCEDURE — 2580000003 HC RX 258: Performed by: HOSPITALIST

## 2020-04-07 PROCEDURE — 6360000002 HC RX W HCPCS: Performed by: INTERNAL MEDICINE

## 2020-04-07 PROCEDURE — 2580000003 HC RX 258: Performed by: INTERNAL MEDICINE

## 2020-04-07 RX ORDER — CHLORDIAZEPOXIDE HYDROCHLORIDE 10 MG/1
10 CAPSULE, GELATIN COATED ORAL 3 TIMES DAILY PRN
Status: DISCONTINUED | OUTPATIENT
Start: 2020-04-07 | End: 2020-04-08

## 2020-04-07 RX ORDER — 0.9 % SODIUM CHLORIDE 0.9 %
500 INTRAVENOUS SOLUTION INTRAVENOUS ONCE
Status: COMPLETED | OUTPATIENT
Start: 2020-04-07 | End: 2020-04-07

## 2020-04-07 RX ORDER — TRAZODONE HYDROCHLORIDE 50 MG/1
TABLET ORAL
Status: DISPENSED
Start: 2020-04-07 | End: 2020-04-08

## 2020-04-07 RX ADMIN — DIAZEPAM 5 MG: 5 TABLET ORAL at 13:13

## 2020-04-07 RX ADMIN — Medication 100 MG: at 08:26

## 2020-04-07 RX ADMIN — ENOXAPARIN SODIUM 40 MG: 40 INJECTION SUBCUTANEOUS at 08:26

## 2020-04-07 RX ADMIN — ASPIRIN 81 MG: 81 TABLET, COATED ORAL at 08:26

## 2020-04-07 RX ADMIN — SODIUM CHLORIDE 500 ML: 9 INJECTION, SOLUTION INTRAVENOUS at 10:40

## 2020-04-07 RX ADMIN — FOLIC ACID 1 MG: 1 TABLET ORAL at 08:26

## 2020-04-07 RX ADMIN — THERA TABS 1 TABLET: TAB at 08:26

## 2020-04-07 RX ADMIN — Medication 10 ML: at 21:20

## 2020-04-07 RX ADMIN — CHLORDIAZEPOXIDE HYDROCHLORIDE 10 MG: 10 CAPSULE ORAL at 18:06

## 2020-04-07 RX ADMIN — CLONIDINE HYDROCHLORIDE 0.1 MG: 0.1 TABLET ORAL at 08:26

## 2020-04-07 RX ADMIN — TRAZODONE HYDROCHLORIDE 150 MG: 50 TABLET ORAL at 21:20

## 2020-04-07 RX ADMIN — CHLORDIAZEPOXIDE HYDROCHLORIDE 25 MG: 25 CAPSULE ORAL at 08:40

## 2020-04-07 RX ADMIN — LISINOPRIL 20 MG: 20 TABLET ORAL at 08:26

## 2020-04-07 RX ADMIN — DIAZEPAM 5 MG: 5 TABLET ORAL at 21:20

## 2020-04-07 RX ADMIN — Medication 10 ML: at 08:27

## 2020-04-07 ASSESSMENT — PAIN SCALES - GENERAL
PAINLEVEL_OUTOF10: 3
PAINLEVEL_OUTOF10: 6
PAINLEVEL_OUTOF10: 8

## 2020-04-07 ASSESSMENT — PAIN DESCRIPTION - LOCATION
LOCATION_2: ABDOMEN
LOCATION: FOOT

## 2020-04-07 ASSESSMENT — PAIN DESCRIPTION - DURATION: DURATION_2: INTERMITTENT

## 2020-04-07 ASSESSMENT — PAIN DESCRIPTION - INTENSITY: RATING_2: 0

## 2020-04-07 ASSESSMENT — PAIN DESCRIPTION - PAIN TYPE
TYPE: CHRONIC PAIN
TYPE_2: ACUTE PAIN

## 2020-04-07 ASSESSMENT — PAIN DESCRIPTION - DESCRIPTORS: DESCRIPTORS_2: SHARP;SHOOTING

## 2020-04-07 ASSESSMENT — PAIN DESCRIPTION - ORIENTATION
ORIENTATION_2: MID
ORIENTATION: RIGHT;LEFT

## 2020-04-07 NOTE — PROGRESS NOTES
Code    Dispo: Anticipate discharge in the next 96 hrs    ____________________________________________________________________________    Subjective:   Overnight Events:   Uneventful overnight. Reports improved symptoms with mild anxiety this morning  Doing better still having some symptoms but not this    Physical Exam Performed:  BP 97/64   Pulse 68   Temp 97.5 °F (36.4 °C) (Oral)   Resp 16   Ht 6' 4\" (1.93 m)   Wt 243 lb 12.8 oz (110.6 kg)   SpO2 98%   BMI 29.68 kg/m²   General appearance: No apparent distress, appears stated age and cooperative. HEENT: Normocephalic, atraumatic, MMM, No sclera icterus/conjuctival palor  Neck: Supple, no thyromegally. No jugular venous distention. Respiratory:  Normal respiratory effort. Clear to auscultation, no Rales/Wheezes/Rhonchi. Cardiovascular: S1/S2 without murmurs, rubs or gallops. RRR  Abdomen: Soft, non-tender, non-distended, bowel sounds present. Musculoskeletal: No clubbing, cyanosis or edema bilaterally. Skin: Skin color, texture, turgor normal.  No rashes or lesions. Neurologic:  Cranial nerves: II-XII intact, SULY, No focal sensory/motor deficits. Still with mild to moderate asterixis  Psychiatric: Alert and oriented, thought content appropriate  Capillary Refill: Brisk,< 3 seconds   Peripheral Pulses: +2 palpable, equal bilaterally       Intake/Output Summary (Last 24 hours) at 4/7/2020 1345  Last data filed at 4/7/2020 0826  Gross per 24 hour   Intake 610 ml   Output --   Net 610 ml       Labs:   Recent Labs     04/05/20  0457   WBC 4.9   HGB 14.0   HCT 41.0   *      Recent Labs     04/05/20  0457      K 3.7      CO2 22   BUN 9   CREATININE <0.5*   CALCIUM 8.9     No results for input(s): Terisa Needles in the last 72 hours.     Urinalysis:  No results found for: Dierdre Billow, BACTERIA, RBCUA, BLOODU, [address], [place] [facility] [address]    Radiology:  No orders to display           Hardeep Singh MD      Please excuse brevity and/or

## 2020-04-08 PROCEDURE — 97166 OT EVAL MOD COMPLEX 45 MIN: CPT

## 2020-04-08 PROCEDURE — 97530 THERAPEUTIC ACTIVITIES: CPT

## 2020-04-08 PROCEDURE — 6360000002 HC RX W HCPCS: Performed by: INTERNAL MEDICINE

## 2020-04-08 PROCEDURE — 6370000000 HC RX 637 (ALT 250 FOR IP): Performed by: INTERNAL MEDICINE

## 2020-04-08 PROCEDURE — 97535 SELF CARE MNGMENT TRAINING: CPT

## 2020-04-08 PROCEDURE — 2580000003 HC RX 258: Performed by: INTERNAL MEDICINE

## 2020-04-08 PROCEDURE — 6370000000 HC RX 637 (ALT 250 FOR IP): Performed by: HOSPITALIST

## 2020-04-08 PROCEDURE — 97116 GAIT TRAINING THERAPY: CPT

## 2020-04-08 PROCEDURE — 2060000000 HC ICU INTERMEDIATE R&B

## 2020-04-08 PROCEDURE — 97162 PT EVAL MOD COMPLEX 30 MIN: CPT

## 2020-04-08 RX ORDER — LISINOPRIL 20 MG/1
20 TABLET ORAL DAILY
Status: DISCONTINUED | OUTPATIENT
Start: 2020-04-09 | End: 2020-04-09 | Stop reason: HOSPADM

## 2020-04-08 RX ORDER — CHLORDIAZEPOXIDE HYDROCHLORIDE 5 MG/1
5 CAPSULE, GELATIN COATED ORAL 3 TIMES DAILY PRN
Status: DISPENSED | OUTPATIENT
Start: 2020-04-08 | End: 2020-04-08

## 2020-04-08 RX ORDER — DIAZEPAM 5 MG/1
5 TABLET ORAL EVERY 12 HOURS PRN
Status: DISCONTINUED | OUTPATIENT
Start: 2020-04-08 | End: 2020-04-08

## 2020-04-08 RX ADMIN — CHLORDIAZEPOXIDE HYDROCHLORIDE 5 MG: 5 CAPSULE ORAL at 08:01

## 2020-04-08 RX ADMIN — Medication 100 MG: at 08:01

## 2020-04-08 RX ADMIN — THERA TABS 1 TABLET: TAB at 08:01

## 2020-04-08 RX ADMIN — ENOXAPARIN SODIUM 40 MG: 40 INJECTION SUBCUTANEOUS at 08:01

## 2020-04-08 RX ADMIN — ASPIRIN 81 MG: 81 TABLET, COATED ORAL at 08:01

## 2020-04-08 RX ADMIN — Medication 10 ML: at 08:02

## 2020-04-08 RX ADMIN — CHLORDIAZEPOXIDE HYDROCHLORIDE 5 MG: 5 CAPSULE ORAL at 17:19

## 2020-04-08 RX ADMIN — FOLIC ACID 1 MG: 1 TABLET ORAL at 08:01

## 2020-04-08 RX ADMIN — TRAZODONE HYDROCHLORIDE 150 MG: 50 TABLET ORAL at 21:43

## 2020-04-08 RX ADMIN — Medication 10 ML: at 21:43

## 2020-04-08 ASSESSMENT — PAIN SCALES - GENERAL
PAINLEVEL_OUTOF10: 0
PAINLEVEL_OUTOF10: 0
PAINLEVEL_OUTOF10: 3

## 2020-04-08 ASSESSMENT — PAIN DESCRIPTION - ORIENTATION: ORIENTATION: RIGHT;LEFT

## 2020-04-08 ASSESSMENT — PAIN DESCRIPTION - PAIN TYPE: TYPE: CHRONIC PAIN

## 2020-04-08 ASSESSMENT — PAIN DESCRIPTION - DESCRIPTORS: DESCRIPTORS: BURNING

## 2020-04-08 ASSESSMENT — PAIN DESCRIPTION - LOCATION: LOCATION: FOOT

## 2020-04-08 NOTE — PROGRESS NOTES
(degrees)  LUE AROM : WFL  RUE AROM (degrees)  RUE AROM : WFL  LUE Strength  L Hand General: 5/5  RUE Strength  R Hand General: 5/5                   Plan   Plan  Times per week: 3-5  Times per day: Daily  Current Treatment Recommendations: Strengthening, Balance Training, Functional Mobility Training, Endurance Training, Neuromuscular Re-education, Safety Education & Training, Patient/Caregiver Education & Training, Self-Care / ADL, Home Management Training    AM-PAC Score        AM-[facility] Inpatient Daily Activity Raw Score: 16 (04/08/20 1255)  AM-PAC Inpatient ADL T-Scale Score : 35.96 (04/08/20 1255)  ADL Inpatient CMS 0-100% Score: 53.32 (04/08/20 1255)  ADL Inpatient CMS G-Code Modifier : CK (04/08/20 1255)    Goals  Short term goals  Time Frame for Short term goals: Discharge  Short term goal 1: Bed mobility I  Short term goal 2: Functional ADL transfers supervision  Short term goal 3: Functional mobility with appropriate AD supervision  Short term goal 4: UB ADLs I, LB ADLs supervision  Long term goals  Time Frame for Long term goals : LTG=STG  Patient Goals   Patient goals : Walk again, go to Alcohol Rehab       Therapy Time   Individual Concurrent Group Co-treatment   Time In 1125         Time Out 1205         Minutes 40              Timed Code Treatment Minutes:   25    Total Treatment Minutes:  106 ALEISHA Hamilton/L [id number]

## 2020-04-08 NOTE — PROGRESS NOTES
Hospitalist Progress Note      PCP: No primary care provider on file. Date of Admission: 4/2/2020    LOS: 6    Chief Complaint:   Chief Complaint   Patient presents with    Withdrawal     pt was at abuse center and noted to have hypertension and withdrawl symtoms, pt states seizures in past from withdrawl, last drank 9 am. pt states headache and abdominal pain. Case Summary:   71-year-old gentleman with history of alcohol abuse admitted for alcohol withdrawals. Patient was on phenobarbital doing well now      Active Hospital Problems    Diagnosis Date Noted    Alcohol withdrawal, uncomplicated ([address] [F10.100] 04/02/2020    Essential hypertension [I10] 04/02/2020    Tremor [R25.1] 04/02/2020    Hypophosphatemia [E83.39] 04/02/2020         Principal Problem:    Alcohol withdrawal, uncomplicated (HCC) with tremors  -Is improving. He has been weaned off of phenobarb  -Stop Librium today  -Stop Ativan today  -Doing well discharge to surgery tomorrow  Active Problems:    Essential hypertension: Improved blood pressure control. Continue, lisinopril    Hypophosphatemia: Repleted. stable        Medications:  Reviewed  Infusion Medications   Scheduled Medications    [START ON 4/9/2020] lisinopril  20 mg Oral Daily    vitamin B-1  100 mg Oral Daily    aspirin  81 mg Oral Daily    traZODone  150 mg Oral Nightly    sodium chloride flush  10 mL Intravenous 2 times per day    enoxaparin  40 mg Subcutaneous Daily    folic acid  1 mg Oral Daily    multivitamin  1 tablet Oral Daily     PRN Meds: chlordiazePOXIDE, loperamide, sodium chloride flush, acetaminophen **OR** acetaminophen, promethazine **OR** ondansetron      DVT Prophylaxis: Subcut enoxaparin  Diet: DIET GENERAL;  Code Status: Full Code    Dispo: Anticipate discharge in the next 96 hrs    ____________________________________________________________________________    Subjective:   Overnight Events:   Uneventful overnight.     Reports

## 2020-04-08 NOTE — CARE COORDINATION
CM updated re: PT/OT's recommendation for SNF prior to patient going back to [facility]. [facility] JEANCARLOS GAGE [facility], [facility], [address] [facility]. Case management will continue to follow progress and update discharge plan as needed.     Lincoln Zaargoza, GLORIAN, .[phone]

## 2020-04-08 NOTE — PROGRESS NOTES
Physical Therapy    Facility/Department: [address]  Initial Assessment    NAME: Paresh Louis  : 1955  MRN: [id number]    Date of Service: 2020    Discharge Recommendations:  Paresh Louis scored a 16/24 on the AM-PAC short mobility form. Current research shows that an AM-PAC score of 17 or less is typically not associated with a discharge to the patient's home setting. Based on the patients AM-PAC score and their current functional mobility deficits, it is recommended that the patient have 5-7 sessions per week of Physical Therapy at d/c to increase the patients independence. If patient discharges prior to next session this note will serve as a discharge summary. Please see below for the latest assessment towards goals. 5-7 sessions per week   PT Equipment Recommendations  Equipment Needed: No    Assessment   Body structures, Functions, Activity limitations: Decreased functional mobility ; Decreased strength;Decreased endurance;Decreased balance;Decreased safe awareness  Assessment: Patient not at baseline function and would benefit from skilled PT to address above deficits and facilitate return to baseline function. At significant risk of falls  Treatment Diagnosis: decreased functional mobility, impaired gait, decreased balance  Prognosis: Good  Decision Making: Medium Complexity  Clinical Presentation: evolving  PT Education: Goals;PT Role;Plan of Care  Patient Education: d/c recommendations - verbalized understanding  Barriers to Learning: none  REQUIRES PT FOLLOW UP: Yes  Activity Tolerance  Activity Tolerance: Patient Tolerated treatment well       Patient Diagnosis(es): The primary encounter diagnosis was Acute alcohol abuse, with unspecified complication ([facility] [address] Diagnoses of History of seizures and Hypophosphatemia were also pertinent to this visit.      has a past medical history of Alcohol abuse.   has no past surgical history on up/down 4 steps with rail and min A  Patient Goals   Patient goals :  To get stronger       Therapy Time   Individual Concurrent Group Co-treatment   Time In 1125         Time Out 1205         Minutes 40         Timed Code Treatment Minutes: 25 Minutes       Terrence Chicas, PT    Thanks, Terrence Chicas PT, DPT [id number]

## 2020-04-08 NOTE — PROGRESS NOTES
Paged hospitalist at this time:    \"Plan is for patient to discharge to SNF . Renuka Burden he is asking if he can have his Librium since he is not going right to [facility]. \"

## 2020-04-09 ENCOUNTER — HOSPITAL ENCOUNTER (INPATIENT)
Age: 65
LOS: 12 days | Discharge: HOME | DRG: 897 | End: 2020-04-21
Attending: PHYSICAL MEDICINE & REHABILITATION | Admitting: PHYSICAL MEDICINE & REHABILITATION
Payer: COMMERCIAL

## 2020-04-09 VITALS
TEMPERATURE: 97.5 F | OXYGEN SATURATION: 97 % | SYSTOLIC BLOOD PRESSURE: 115 MMHG | HEART RATE: 76 BPM | RESPIRATION RATE: 17 BRPM | WEIGHT: 242.3 LBS | HEIGHT: 76 IN | BODY MASS INDEX: 29.51 KG/M2 | DIASTOLIC BLOOD PRESSURE: 77 MMHG

## 2020-04-09 PROBLEM — R26.89 ABNORMALITY OF GAIT DUE TO IMPAIRMENT OF BALANCE: Status: ACTIVE | Noted: 2020-04-09

## 2020-04-09 PROCEDURE — 6360000002 HC RX W HCPCS: Performed by: HOSPITALIST

## 2020-04-09 PROCEDURE — 1280000000 HC REHAB R&B

## 2020-04-09 PROCEDURE — 2580000003 HC RX 258: Performed by: INTERNAL MEDICINE

## 2020-04-09 PROCEDURE — 6370000000 HC RX 637 (ALT 250 FOR IP): Performed by: PHYSICAL MEDICINE & REHABILITATION

## 2020-04-09 PROCEDURE — 6360000002 HC RX W HCPCS: Performed by: INTERNAL MEDICINE

## 2020-04-09 PROCEDURE — 6370000000 HC RX 637 (ALT 250 FOR IP): Performed by: HOSPITALIST

## 2020-04-09 PROCEDURE — 6370000000 HC RX 637 (ALT 250 FOR IP): Performed by: INTERNAL MEDICINE

## 2020-04-09 RX ORDER — LISINOPRIL 20 MG/1
20 TABLET ORAL DAILY
Status: CANCELLED | OUTPATIENT
Start: 2020-04-10

## 2020-04-09 RX ORDER — CHLORDIAZEPOXIDE HYDROCHLORIDE 5 MG/1
5 CAPSULE, GELATIN COATED ORAL 3 TIMES DAILY PRN
Qty: 15 CAPSULE | Refills: 0 | Status: ON HOLD | OUTPATIENT
Start: 2020-04-09 | End: 2020-04-20 | Stop reason: HOSPADM

## 2020-04-09 RX ORDER — CHLORDIAZEPOXIDE HYDROCHLORIDE 5 MG/1
5 CAPSULE, GELATIN COATED ORAL EVERY 6 HOURS PRN
Status: DISCONTINUED | OUTPATIENT
Start: 2020-04-09 | End: 2020-04-16

## 2020-04-09 RX ORDER — POLYETHYLENE GLYCOL 3350 17 G/17G
17 POWDER, FOR SOLUTION ORAL DAILY PRN
Status: DISCONTINUED | OUTPATIENT
Start: 2020-04-09 | End: 2020-04-17

## 2020-04-09 RX ORDER — ONDANSETRON 4 MG/1
4 TABLET, ORALLY DISINTEGRATING ORAL EVERY 8 HOURS PRN
Status: CANCELLED | OUTPATIENT
Start: 2020-04-09

## 2020-04-09 RX ORDER — MULTIVITAMIN WITH FOLIC ACID 400 MCG
1 TABLET ORAL DAILY
Status: CANCELLED | OUTPATIENT
Start: 2020-04-10

## 2020-04-09 RX ORDER — POLYETHYLENE GLYCOL 3350 17 G/17G
17 POWDER, FOR SOLUTION ORAL DAILY PRN
Status: CANCELLED | OUTPATIENT
Start: 2020-04-09

## 2020-04-09 RX ORDER — DIPHENHYDRAMINE HCL 25 MG
25 TABLET ORAL EVERY 6 HOURS PRN
Status: DISCONTINUED | OUTPATIENT
Start: 2020-04-09 | End: 2020-04-21 | Stop reason: HOSPADM

## 2020-04-09 RX ORDER — FOLIC ACID 1 MG/1
1 TABLET ORAL DAILY
Status: DISCONTINUED | OUTPATIENT
Start: 2020-04-10 | End: 2020-04-21 | Stop reason: HOSPADM

## 2020-04-09 RX ORDER — HYDRALAZINE HYDROCHLORIDE 25 MG/1
50 TABLET, FILM COATED ORAL EVERY 8 HOURS PRN
Status: CANCELLED | OUTPATIENT
Start: 2020-04-09

## 2020-04-09 RX ORDER — ASPIRIN 81 MG/1
81 TABLET ORAL DAILY
Status: CANCELLED | OUTPATIENT
Start: 2020-04-10

## 2020-04-09 RX ORDER — MULTIVITAMIN WITH FOLIC ACID 400 MCG
1 TABLET ORAL DAILY
Status: DISCONTINUED | OUTPATIENT
Start: 2020-04-10 | End: 2020-04-21 | Stop reason: HOSPADM

## 2020-04-09 RX ORDER — KETOROLAC TROMETHAMINE 30 MG/ML
30 INJECTION, SOLUTION INTRAMUSCULAR; INTRAVENOUS EVERY 6 HOURS PRN
Status: DISCONTINUED | OUTPATIENT
Start: 2020-04-09 | End: 2020-04-09 | Stop reason: HOSPADM

## 2020-04-09 RX ORDER — KETOROLAC TROMETHAMINE 30 MG/ML
30 INJECTION, SOLUTION INTRAMUSCULAR; INTRAVENOUS EVERY 6 HOURS PRN
Status: CANCELLED | OUTPATIENT
Start: 2020-04-09 | End: 2020-04-14

## 2020-04-09 RX ORDER — LOPERAMIDE HYDROCHLORIDE 2 MG/1
4 CAPSULE ORAL EVERY 6 HOURS PRN
Status: CANCELLED | OUTPATIENT
Start: 2020-04-09

## 2020-04-09 RX ORDER — ACETAMINOPHEN 325 MG/1
650 TABLET ORAL EVERY 4 HOURS PRN
Status: DISCONTINUED | OUTPATIENT
Start: 2020-04-09 | End: 2020-04-21 | Stop reason: HOSPADM

## 2020-04-09 RX ORDER — DIPHENHYDRAMINE HCL 25 MG
25 TABLET ORAL EVERY 6 HOURS PRN
Status: CANCELLED | OUTPATIENT
Start: 2020-04-09

## 2020-04-09 RX ORDER — ONDANSETRON 4 MG/1
4 TABLET, ORALLY DISINTEGRATING ORAL EVERY 8 HOURS PRN
Status: DISCONTINUED | OUTPATIENT
Start: 2020-04-09 | End: 2020-04-21 | Stop reason: HOSPADM

## 2020-04-09 RX ORDER — ACETAMINOPHEN 325 MG/1
650 TABLET ORAL EVERY 4 HOURS PRN
Status: CANCELLED | OUTPATIENT
Start: 2020-04-09

## 2020-04-09 RX ORDER — FOLIC ACID 1 MG/1
1 TABLET ORAL DAILY
Status: CANCELLED | OUTPATIENT
Start: 2020-04-10

## 2020-04-09 RX ORDER — ASPIRIN 81 MG/1
81 TABLET ORAL DAILY
Status: DISCONTINUED | OUTPATIENT
Start: 2020-04-10 | End: 2020-04-21 | Stop reason: HOSPADM

## 2020-04-09 RX ORDER — KETOROLAC TROMETHAMINE 30 MG/ML
30 INJECTION, SOLUTION INTRAMUSCULAR; INTRAVENOUS EVERY 6 HOURS PRN
Status: DISPENSED | OUTPATIENT
Start: 2020-04-09 | End: 2020-04-14

## 2020-04-09 RX ORDER — LISINOPRIL 20 MG/1
20 TABLET ORAL DAILY
Status: DISCONTINUED | OUTPATIENT
Start: 2020-04-10 | End: 2020-04-21 | Stop reason: HOSPADM

## 2020-04-09 RX ORDER — THIAMINE MONONITRATE (VIT B1) 100 MG
100 TABLET ORAL DAILY
Status: CANCELLED | OUTPATIENT
Start: 2020-04-10

## 2020-04-09 RX ORDER — HYDRALAZINE HYDROCHLORIDE 25 MG/1
50 TABLET, FILM COATED ORAL EVERY 8 HOURS PRN
Status: DISCONTINUED | OUTPATIENT
Start: 2020-04-09 | End: 2020-04-21 | Stop reason: HOSPADM

## 2020-04-09 RX ORDER — TRAZODONE HYDROCHLORIDE 50 MG/1
150 TABLET ORAL NIGHTLY
Status: DISCONTINUED | OUTPATIENT
Start: 2020-04-09 | End: 2020-04-21 | Stop reason: HOSPADM

## 2020-04-09 RX ORDER — LOPERAMIDE HYDROCHLORIDE 2 MG/1
4 CAPSULE ORAL EVERY 6 HOURS PRN
Status: DISCONTINUED | OUTPATIENT
Start: 2020-04-09 | End: 2020-04-21 | Stop reason: HOSPADM

## 2020-04-09 RX ORDER — THIAMINE MONONITRATE (VIT B1) 100 MG
100 TABLET ORAL DAILY
Status: DISCONTINUED | OUTPATIENT
Start: 2020-04-10 | End: 2020-04-21 | Stop reason: HOSPADM

## 2020-04-09 RX ORDER — TRAZODONE HYDROCHLORIDE 50 MG/1
150 TABLET ORAL NIGHTLY
Status: CANCELLED | OUTPATIENT
Start: 2020-04-09

## 2020-04-09 RX ADMIN — LISINOPRIL 20 MG: 20 TABLET ORAL at 08:41

## 2020-04-09 RX ADMIN — ASPIRIN 81 MG: 81 TABLET, COATED ORAL at 08:40

## 2020-04-09 RX ADMIN — ENOXAPARIN SODIUM 40 MG: 40 INJECTION SUBCUTANEOUS at 08:40

## 2020-04-09 RX ADMIN — CHLORDIAZEPOXIDE HYDROCHLORIDE 5 MG: 5 CAPSULE ORAL at 20:03

## 2020-04-09 RX ADMIN — Medication 100 MG: at 08:40

## 2020-04-09 RX ADMIN — TRAZODONE HYDROCHLORIDE 150 MG: 50 TABLET ORAL at 21:38

## 2020-04-09 RX ADMIN — Medication 10 ML: at 08:41

## 2020-04-09 RX ADMIN — FOLIC ACID 1 MG: 1 TABLET ORAL at 08:41

## 2020-04-09 RX ADMIN — KETOROLAC TROMETHAMINE 30 MG: 30 INJECTION, SOLUTION INTRAMUSCULAR at 09:21

## 2020-04-09 RX ADMIN — Medication 10 ML: at 09:22

## 2020-04-09 RX ADMIN — THERA TABS 1 TABLET: TAB at 08:40

## 2020-04-09 ASSESSMENT — PAIN DESCRIPTION - DESCRIPTORS: DESCRIPTORS: ACHING

## 2020-04-09 ASSESSMENT — PAIN SCALES - GENERAL
PAINLEVEL_OUTOF10: 0
PAINLEVEL_OUTOF10: 7

## 2020-04-09 ASSESSMENT — PAIN DESCRIPTION - PAIN TYPE: TYPE: ACUTE PAIN

## 2020-04-09 ASSESSMENT — PAIN DESCRIPTION - ORIENTATION: ORIENTATION: LEFT

## 2020-04-09 NOTE — CONSULTS
weight changes and diabetic symptoms including polyuria, polydipsia and polyphagia. MUSCULOSKELETAL: negative for pain, joint swelling, decreased range of motion and muscle weakness. NEUROLOGICAL: negative for headaches, slurred speech, unilateral weakness. PSYCHIATRIC/BEHAVIORAL: negative for hallucinations, behavioral problems, confusion and agitation. All pertinent positives are noted in the HPI. Physical Examination:  Vitals:   Patient Vitals for the past 24 hrs:   BP Temp Temp src Pulse Resp SpO2 Weight   04/09/20 1210 115/77 97.5 °F (36.4 °C) Axillary 76 17 97 % --   04/09/20 0815 122/71 97.5 °F (36.4 °C) Axillary 77 17 96 % --   04/09/20 0503 124/80 97.7 °F (36.5 °C) Oral 67 16 95 % 242 lb 4.8 oz (109.9 kg)   04/09/20 0030 138/73 97.4 °F (36.3 °C) Oral 70 16 100 % --   04/08/20 2050 105/63 97.8 °F (36.6 °C) Oral 70 16 98 % --   04/08/20 1714 (!) 143/91 98.2 °F (36.8 °C) Oral 70 16 98 % --   04/08/20 1309 -- -- -- 80 -- -- --   04/08/20 1308 112/80 98.5 °F (36.9 °C) Oral 80 18 98 % --     Psych: Stable mood, normal judgement, normal affect. Const: No distress  Eyes: Conjunctiva noninjected, no icterus noted; pupils equal, round. HENT: Atraumatic, normocephalic; Oral mucosa moist  Neck: Trachea midline, neck supple. No thyromegaly noted. CV: No audible murmurs  Resp: No increased WOB, no audible wheezing   GI: Nondistended   Neuro: Alert, oriented, appropriate. Skin: No visible abnormalities  MSK: No joint abnormalities noted. Ext: No significant edema appreciated. No varicosities.     Lab Results   Component Value Date    WBC 4.9 04/05/2020    HGB 14.0 04/05/2020    HCT 41.0 04/05/2020    .0 (H) 04/05/2020     (L) 04/05/2020     Lab Results   Component Value Date    INR 0.95 04/02/2020    PROTIME 11.0 04/02/2020     Lab Results   Component Value Date    CREATININE <0.5 (L) 04/05/2020    BUN 9 04/05/2020     04/05/2020    K 3.7 04/05/2020     04/05/2020    CO2 22 04/05/2020     Lab Results   Component Value Date    ALT 24 04/02/2020    AST 34 04/02/2020    ALKPHOS 44 04/02/2020    BILITOT 0.9 04/02/2020         The above laboratory data have been reviewed. Body mass index is 29.49 kg/m². Assessment and Plan:  Alcohol withdrawal: Wean from Librium and Precedex. PT/OT  HTN    Dispo: Patient is an appropriate ARU candidate. Approved by administration for admission today. Orders placed for transfer today. Thank you for the consultation. Jasmine Vázquez MD 4/9/2020, 12:25 PM     * This document was created using dictation software. While all precautions were taken to ensure accuracy, errors may have occurred. Please disregard any typographical errors.

## 2020-04-09 NOTE — PROGRESS NOTES
PM assessment complete and documented. Pt wanted to wait till a little later to take trazodone. No other needs or concerns expressed. Will continue to monitor.

## 2020-04-09 NOTE — PROGRESS NOTES
Resting between care. Denies pain in left knee and buttock. No swelling or bruising noted. Will continue to monitor.

## 2020-04-09 NOTE — DISCHARGE INSTR - COC
Continuity of Care Form    Patient Name: Cruz Shipman   :  1955  MRN:  [id number]    Admit date:  2020  Discharge date:  ***    Code Status Order: Full Code   Advance Directives:   Advance Care Flowsheet Documentation     Date/Time Healthcare Directive Type of Healthcare Directive Copy in [address] Agent's Name Healthcare Agent's Phone Number    [phone]  No, patient does not have an advance directive for healthcare treatment -- -- -- -- --          Admitting Physician:  Eve Serrano MD  PCP: No primary care provider on file. Discharging Nurse: [facility] Unit/Room#: 3FS-0122/2517-75  Discharging Unit Phone Number: ***    Emergency Contact:   Extended Emergency Contact Information  Primary Emergency Contact: Chaparrita Rainey Bartelso e[employer] Phone: [phone]  Relation: Other    Past Surgical History:  History reviewed. No pertinent surgical history. Immunization History: There is no immunization history on file for this patient.     Active Problems:  Patient Active Problem List   Diagnosis Code    Alcohol withdrawal, uncomplicated ([facility] [address] E34.682    Essential hypertension I10    Tremor R25.1    Hypophosphatemia E83.39       Isolation/Infection:   Isolation          No Isolation        Patient Infection Status     None to display          Nurse Assessment:  Last Vital Signs: /77   Pulse 76   Temp 97.5 °F (36.4 °C) (Axillary)   Resp 17   Ht 6' 4\" (1.93 m)   Wt 242 lb 4.8 oz (109.9 kg)   SpO2 97%   BMI 29.49 kg/m²     Last documented pain score (0-10 scale): Pain Level: 7  Last Weight:   Wt Readings from Last 1 Encounters:   20 242 lb 4.8 oz (109.9 kg)     Mental Status:  {IP PT MENTAL STATUS:}    IV Access:  { TERESA IV ACCESS:583876883}    Nursing Mobility/ADLs:  Walking   {CHP DME NEMR:346688605}  Transfer  {CHP DME LMOY:360462731}  Bathing  {CHP DME WZZC:930661058}  Dressing  {CHP DME NIQS:543855148}  Toileting  {CHP DME ZFCU:128765311}  Feeding  {CHP DME TXZZ:121907659}  Med Admin  {CHP DME UXBY:287302451}  Med Delivery   { TERESA MED Delivery:292962715}    Wound Care Documentation and Therapy:        Elimination:  Continence:   · Bowel: {YES / TN:60465}  · Bladder: {YES / QZ:13025}  Urinary Catheter: {Urinary Catheter:177034154}   Colostomy/Ileostomy/Ileal Conduit: {YES / FM:65093}       Date of Last BM: ***    Intake/Output Summary (Last 24 hours) at 2020 1550  Last data filed at 2020 1715  Gross per 24 hour   Intake 240 ml   Output --   Net 240 ml     I/O last 3 completed shifts:   In: 240 [P.O.:240]  Out: -     Safety Concerns:     [employer] Safety Concerns:449404915}    Impairments/Disabilities:      [employer] Impairments/Disabilities:054209297}    Nutrition Therapy:  Current Nutrition Therapy:   [employer] Diet List:394216486}    Routes of Feeding: {P DME Other Feedings:023993502}  Liquids: {Slp liquid thickness:31698}  Daily Fluid Restriction: {P DME Yes amt example:153540223}  Last Modified Barium Swallow with Video (Video Swallowing Test): {Done Not Done ZVRO:416170630}    Treatments at the Time of Hospital Discharge:   Respiratory Treatments: ***  Oxygen Therapy:  {Therapy; copd oxygen:29380}  Ventilator:    { CC Vent TYT}    Rehab Therapies: {THERAPEUTIC INTERVENTION:3441038394}  Weight Bearing Status/Restrictions: [employer] Weight Bearin}  Other Medical Equipment (for information only, NOT a DME order):  {EQUIPMENT:970604886}  Other Treatments: ***    Patient's personal belongings (please select all that are sent with patient):  {[facility] DME Belongings:109413885}    RN SIGNATURE:  {Esignature:502850413}    CASE MANAGEMENT/SOCIAL WORK SECTION    Inpatient Status Date: ***    Readmission Risk Assessment Score:  Readmission Risk              Risk of Unplanned Readmission:        5           Discharging to Facility/ Agency   · Name:   · Address:  · Phone:  · Fax:    Dialysis Facility (if applicable)

## 2020-04-09 NOTE — PROGRESS NOTES
Assisted pt to bathroom using walker. While pt was washing his hands and drying them, his legs gave out and he landed on his left buttock and knee. Denies pain. No bruising or swelling noted. Willem Solano CNP notified. No new orders given unless pt experiences significant pain. Will continue to monitor.

## 2020-04-09 NOTE — PLAN OF CARE
Problem: Falls - Risk of:  Goal: Will remain free from falls  Description: Will remain free from falls  Outcome: Ongoing  Note: Call light in reach; bed in low position; SR up x2; pt affirms awareness and understanding of need to call for and await assist with OOB mobility; alarm active. Problem: Pain:  Goal: Pain level will decrease  Description: Pain level will decrease  Outcome: Ongoing  Note: Pt c/o left hip, btx, left shoulder pain that pt attributes to recent fall - discussed pain management with Dr LIBAN Morejon     Problem: Discharge Planning:  Goal: Discharged to appropriate level of care  Description: Discharged to appropriate level of care  Outcome: Ongoing  Note: Anticipate discharge to SNF or ARU prior to returning to [facility] for completion of alcohol rehab     Problem: Fluid Volume - Deficit:  Goal: Absence of fluid volume deficit signs and symptoms  Description: Absence of fluid volume deficit signs and symptoms  Outcome: Ongoing  Note: No s/s of fluid overload or dehydration     Problem: Nutrition Deficit:  Goal: Ability to achieve adequate nutritional intake will improve  Description: Ability to achieve adequate nutritional intake will improve  Outcome: Ongoing  Note: Pt affirms appetite restored to normal since resolution of recent N/V     Problem: Neurological  Intervention: Neurological Status Assessment  Note: Gait, balance unsteady; repeats self frequently in conversation; delayed responses to questioning at times     Problem: Cardiovascular  Goal: No DVT, peripheral vascular complications  Outcome: Ongoing  Note: No calf tenderness or s/s of dvt  Goal: Hemodynamic stability  Outcome: Ongoing  Note: VS WNL; remains in SR  Goal: Anticoagulate/Hct stable  Outcome: Ongoing  Note: Remains on Lovenox + ASA regimen     Problem: Respiratory  Goal: No pulmonary complications  Outcome: Ongoing  Note: BBS CTA  Goal: O2 Sat > 90%  Outcome: Ongoing  Note: WNL on RA     Problem: GI  Goal: No bowel

## 2020-04-10 PROCEDURE — 6360000002 HC RX W HCPCS: Performed by: PHYSICAL MEDICINE & REHABILITATION

## 2020-04-10 PROCEDURE — 97530 THERAPEUTIC ACTIVITIES: CPT

## 2020-04-10 PROCEDURE — 1280000000 HC REHAB R&B

## 2020-04-10 PROCEDURE — 97535 SELF CARE MNGMENT TRAINING: CPT

## 2020-04-10 PROCEDURE — 6370000000 HC RX 637 (ALT 250 FOR IP): Performed by: PHYSICAL MEDICINE & REHABILITATION

## 2020-04-10 PROCEDURE — 97161 PT EVAL LOW COMPLEX 20 MIN: CPT

## 2020-04-10 PROCEDURE — 97116 GAIT TRAINING THERAPY: CPT

## 2020-04-10 PROCEDURE — 97165 OT EVAL LOW COMPLEX 30 MIN: CPT

## 2020-04-10 RX ORDER — ESCITALOPRAM OXALATE 10 MG/1
5 TABLET ORAL DAILY
Status: DISCONTINUED | OUTPATIENT
Start: 2020-04-10 | End: 2020-04-20

## 2020-04-10 RX ADMIN — THERA TABS 1 TABLET: TAB at 08:56

## 2020-04-10 RX ADMIN — FOLIC ACID 1 MG: 1 TABLET ORAL at 08:57

## 2020-04-10 RX ADMIN — CHLORDIAZEPOXIDE HYDROCHLORIDE 5 MG: 5 CAPSULE ORAL at 09:08

## 2020-04-10 RX ADMIN — CHLORDIAZEPOXIDE HYDROCHLORIDE 5 MG: 5 CAPSULE ORAL at 22:26

## 2020-04-10 RX ADMIN — CHLORDIAZEPOXIDE HYDROCHLORIDE 5 MG: 5 CAPSULE ORAL at 14:54

## 2020-04-10 RX ADMIN — LISINOPRIL 20 MG: 20 TABLET ORAL at 08:56

## 2020-04-10 RX ADMIN — Medication 100 MG: at 08:56

## 2020-04-10 RX ADMIN — TRAZODONE HYDROCHLORIDE 150 MG: 50 TABLET ORAL at 22:26

## 2020-04-10 RX ADMIN — ENOXAPARIN SODIUM 40 MG: 40 INJECTION SUBCUTANEOUS at 08:56

## 2020-04-10 RX ADMIN — ASPIRIN 81 MG: 81 TABLET, COATED ORAL at 08:57

## 2020-04-10 ASSESSMENT — PAIN SCALES - GENERAL: PAINLEVEL_OUTOF10: 0

## 2020-04-10 ASSESSMENT — PAIN SCALES - WONG BAKER: WONGBAKER_NUMERICALRESPONSE: 2

## 2020-04-10 NOTE — PROGRESS NOTES
hips)  Toileting: Contact guard assistance(steadying assist for clothing management)  Bed mobility  Supine to Sit: Supervision(HOB elevated and use of handrail this date)  Transfers  Sit to stand: Contact guard assistance  Stand to sit: Contact guard assistance  Cognition  Overall Cognitive Status: Exceptions  Arousal/Alertness: Appropriate responses to stimuli  Following Commands: Follows all commands without difficulty  Attention Span: Appears intact  Memory: Appears intact  Safety Judgement: Decreased awareness of need for assistance;Decreased awareness of need for safety  Problem Solving: Decreased awareness of errors  Insights: Decreased awareness of deficits  Initiation: Does not require cues  Sequencing: Requires cues for some  Perception  Overall Perceptual Status: WFL  Sensation  Overall Sensation Status: Impaired(Decreased light touch Bilateral feet to ankles)  LUE AROM (degrees)  LUE AROM : WFL  RUE AROM (degrees)  RUE AROM : WFL  LUE Strength  Gross LUE Strength: WFL  RUE Strength  Gross RUE Strength: WFL        Second Session:  Pt in recliner chair upon arrival, agreeable to  OT session. Session initiated with patient finishing lunch requesting to complete oral care once finished. Pt reported that he did not understand why he had to have a chair alarm and was educated on use of chair alarm for safety d/t fall risk, pt verbalized understanding but would benefit from reinforcement for carryover. Next, Pt completed sit<>stand transfer with CGA requiring verbal cues and education for safe hand placement and controlled descent, pt verbalized and demo understanding. Next pt completed functional mobility to/from bathroom with use of RW and CGA. Pt completed oral care in stance with CGA for steadying assist. Pt completed functional mobility back to recliner requiring seated rest break d/t fatigue in BLE.  Session ended with patient completing the following tasks while in stance to increase standing tolerance and dynamic standing balance:  1) shuffling a deck of cards x2  2) sorting a deck of cards by suit  3) sorting x4 piles of cards from smallest to largest   Pt demo ability to stand for x5 minutes with CGA before requiring seated rest break this date. Pt left in recliner with chair alarm on, call light near, and all needs met. Plan   Plan  Times per week: 5-7x/wk; 90 minutes  Times per day: Daily  Current Treatment Recommendations: Strengthening, Balance Training, Functional Mobility Training, Endurance Training, Neuromuscular Re-education, Safety Education & Training, Patient/Caregiver Education & Training, Self-Care / ADL, Home Management Training, Equipment Evaluation, Education, & procurement    G-Code     OutComes Score                                                  AM-PAC Score             Goals  Short term goals  Time Frame for Short term goals: 7 days   Short term goal 1: Pt will complete all functional transfers at MOD I level  Short term goal 2: Pt will complete UB/LB dressing at MOD I level  Short term goal 3: Pt will complete UB/LB bathing at MOD I level  Short term goal 4: Pt will complete light IADL task with supervision  Short term goal 5: Pt will tolerate at least 10 minutes in stance with supervision assist while completing functional task   Long term goals  Time Frame for Long term goals : LTG=STG  Patient Goals   Patient goals : increase balance and functional mobility.  Gain independence back        Therapy Time   Individual Concurrent Group Co-treatment   Time In 0730         Time Out 0830         Minutes 60         Timed Code Treatment Minutes: 39 Minutes     Second Session Therapy Time:   Individual Concurrent Group Co-treatment   Time In 0895         Time Out 9348         Minutes 30           Timed Code Treatment Minutes:  60 + 30    Total Treatment Minutes:  90 min    [address] [facility], [address] [place] [address] [id number]

## 2020-04-10 NOTE — PROGRESS NOTES
Admission Period/Goal QM Codes    QM Admit Code Goal Code   Eating 6 6   Oral Hygiene 4 6   Toileting Hygiene 4 6   Shower/Bathing 4 6   UB Dressing 5 6   LB Dressing 4 6   Putting on/off Footwear 5 6   Rolling Left and Right 4 6   Sit To Lying 4 6   Lying to Sitting on Bedside 4 6   Sit to Stand 3 6   Chair/Bed to Chair Transfer 2 6   Toilet Transfers 3 6   Car Transfers 3 6   Walk 10 Feet 3 6   Walk 50 Feet with Two Turns 3 6   Walk 150 Feet 3 6   Walk 10 Feet on Uneven Surfaces 88 6   1 Step (Curb) 3 -   4 Steps 88 6   12 Steps 80 6   Picking up Object from Floor 1 6   Wheel 50 Feet with 2 Turns 9 -   Type     Wheel 150 Feet 9 -   Type         Following discussion at the Quality Measure Huddle, the above codes were determined to be the patient's usual performance at admission.     OT:  Jaden Mckinney OTR/L, 4/13/2020, 1216     PT:  Rani Persaud PT [id number],  C/NDT, 4/13/20, 14:24     RN:  Erica Holland RN BSN 4/13/2020 @ 1600     :  Lamar Crabtree, [address] [place] [address], 4/13/2020 3697

## 2020-04-10 NOTE — PROGRESS NOTES
Physical Therapy    Facility/Department: [facility] ACUTE REHAB UNIT  Initial Assessment    NAME: Froylan Solano  : 1955  MRN: [id number]    Date of Service: 4/10/2020    Discharge Recommendations:  Patient would benefit from continued therapy after discharge, S Level 3   PT Equipment Recommendations  Other: To be determined based on progress    Assessment   Body structures, Functions, Activity limitations: Decreased functional mobility ; Decreased ADL status; Decreased ROM; Decreased safe awareness;Decreased cognition;Decreased endurance;Decreased sensation;Decreased balance;Decreased coordination  Assessment: Pt with above deficits and questionable insight into deficits. Pt is not safe for independent ambulation at this time and would benefit from skilled PT to improve safety and decrease risk of falls. Treatment Diagnosis: decreased functional mobility, impaired gait, decreased balance  Prognosis: Good  Decision Making: Low Complexity  Clinical Presentation: evolving  PT Education: Goals;PT Role;Plan of Care;Transfer Training;General Safety;Gait Training  Patient Education: Pt verbalized understanding but would benefit from reinforcement  Barriers to Learning: Emotionally labile, cognition  REQUIRES PT FOLLOW UP: Yes  Activity Tolerance  Activity Tolerance: Patient Tolerated treatment well       Patient Diagnosis(es): Debility   has a past medical history of Alcohol abuse.   has no past surgical history on file. Restrictions  Restrictions/Precautions  Restrictions/Precautions: Fall Risk(High risk per [facility] [employer])  Required Braces or Orthoses?: No  Position Activity Restriction  Other position/activity restrictions: Froylan Solaon is a 62 y.o. male presents to the emergency department today with acute alcohol withdrawal.  The patient reports that he was sent over from [facility] for concern of alcohol withdrawal with history of withdrawal seizure. The patient reports he last drank this morning.   States that he transfer with min A. Attempted to bend forward to  dropped item but was unsteady and required assist to . Ambulated to bathroom with Min A and RW. Required Min A for standing balance to urinate; pt managed clothing independently. Pt appear midly unsteady throughout. Stood at sink to wash hands with Min A. Returned to room and to chair with Chair alarm activated, call light and needs in reach    2nd session:  Patient emotionally labile, crying at times, stating he wants to get his walking better. He was able to ambulate 270' w/ RW and CGA and 320' w/ cane and MIN assist.  Gait is ataxic w/ dec'd foot clearance especially on the right w/ wide CLIF and increased lateral postural sway during stance phase. He was able ascend/descend 8 stairs w/ single left railing and cane in right hand. Post treatment, patient returned to room and left in chair w/ call light in reach and chair alarm on. Nurse notified.      Plan   Plan  Times per week: 90 minutes on 5 days/week  Current Treatment Recommendations: Strengthening, ROM, Balance Training, Functional Mobility Training, Endurance Training, Transfer Training, Gait Training, Stair training, Neuromuscular Re-education, Safety Education & Training  Safety Devices  Type of devices: Call light within reach, Chair alarm in place  Restraints  Initially in place: No    Goals  Short term goals  Short term goal 1: Pt will be Independent with all bed mobility  Short term goal 2: Pt will be  Modified independent with AAD for transfers  Short term goal 3: Pt will Ambulate 200' with LRAD and Modified independence  Short term goal 4: Pt will ascend/descend 4 steps with Mod I  Long term goals  Time Frame for Long term goals : STG = LTGs  Patient Goals   Patient goals : \"to get walking better and using the cane again\"     Therapy Time   Individual Concurrent Group Co-treatment   Time In 1008         Time Out 1112         Minutes 64         Timed code treatment minutes:

## 2020-04-10 NOTE — H&P
negative for headaches, slurred speech, unilateral weakness. PSYCHIATRIC/BEHAVIORAL: negative for hallucinations, behavioral problems, confusion and agitation.      All pertinent positives are noted in the HPI. Physical Examination:  Vitals:   Patient Vitals for the past 24 hrs:   BP Temp Temp src Pulse Resp SpO2 Height Weight   04/10/20 0850 121/83 98.2 °F (36.8 °C) Oral 78 16 96 % -- --   04/09/20 1958 127/79 97.6 °F (36.4 °C) Oral 74 18 97 % -- --   04/09/20 1700 125/79 97.3 °F (36.3 °C) Oral 64 16 100 % 6' 4\" (1.93 m) 234 lb 12.8 oz (106.5 kg)     Psych: Stable mood, normal judgement, normal affect   Const: No distress  Eyes: Conjunctiva noninjected, no icterus noted; pupils equal, round. HENT: Atraumatic, normocephalic; Oral mucosa moist  Neck: Trachea midline, neck supple. No thyromegaly noted. CV: Regular rate and rhythm, no murmur rub or gallop noted  Resp: Lungs clear to auscultation bilaterally, no rales wheezes or ronchi, no retractions. Respirations unlabored. GI: Soft, nontender, nondistended. Normal bowel sounds. No palpable masses. Neuro: Alert, oriented, appropriate. No cranial nerve deficits appreciated. Sensation decreased to light touch in BLE from mid-tibia and distal. Motor examination reveals normal strength in all four limbs diffusely. Reflexes normal and symmetric. Skin: Normal temperature and turgor  MSK: No joint abnormalities noted. Ext: No significant edema appreciated. No varicosities.     Lab Results   Component Value Date    WBC 4.9 04/05/2020    HGB 14.0 04/05/2020    HCT 41.0 04/05/2020    .0 (H) 04/05/2020     (L) 04/05/2020     Lab Results   Component Value Date    INR 0.95 04/02/2020    PROTIME 11.0 04/02/2020     Lab Results   Component Value Date    CREATININE <0.5 (L) 04/05/2020    BUN 9 04/05/2020     04/05/2020    K 3.7 04/05/2020     04/05/2020    CO2 22 04/05/2020     Lab Results   Component Value Date    ALT 24 04/02/2020    AST 34 04/02/2020    ALKPHOS 44 04/02/2020    BILITOT 0.9 04/02/2020       The above laboratory data have been reviewed. Body mass index is 28.58 kg/m². Barriers to Discharge: balance, ataxia, safety, endurance, ADLs, peripheral neuropathy    POST ADMISSION PHYSICIAN EVALUATION  The patient has agreed to being admitted to our comprehensive inpatient  rehabilitation facility consisting of at least 180 minutes of therapy a day,  5 out of 7 days a week. The patient/family has a good understanding of our discharge process. The  patient has potential to make improvement and is in need of at least two of  the following multidisciplinary therapies including but not limited to  physical, occupational, respiratory, and speech, nutritional services, wound care, and prosthetics and orthotics. Given the patients complex condition  and risk of further medical complications, rehabilitation services cannot be  safely provided at a lower level of care such as a skilled nursing facility. I have compared the patients medical and functional status at the time of the  preadmission screening and the same on this date, and there are no significant changes except as documented below in the assessment and plan. By signing this document, I acknowledge that I have personally performed a  full physical examination on this patient within 24 hours of admission to  this inpatient rehabilitation facility and have determined the patient to be  able to tolerate the above course of treatment at an intensive level for a  reasonable period of time. I will be completing a detailed individualized  Plan of Care for this patient by day four of the patients stay based upon the  Preadmission Screen, this Post-Admission Evaluation, and the therapy  evaluations. Assessment and Plan:  Alcohol withdrawal: Weaned from Precedex. Continue PRN Librium.   PT/OT    HTN: lisinopril 20    Insomnia: trazodone 150    Adjustement disorder: - start lexapro

## 2020-04-10 NOTE — DISCHARGE SUMMARY
[facility] DISCHARGE SUMMARY    Patient Demographics    Patient. Amanda Doran  Date of Birth. 1955  MRN. [id number]     Primary care provider. No primary care provider on file. (Tel: None)    Admit date: 4/2/2020    Discharge date (blank if same as Note Date): 4/9/2020  Note Date: 4/10/2020     Reason for Hospitalization. Chief Complaint   Patient presents with    Withdrawal     pt was at abuse center and noted to have hypertension and withdrawl symtoms, pt states seizures in past from withdrawl, last drank 9 am. pt states headache and abdominal pain. [facility] Course. Alcohol withdrawal  -Needing IV phenobarb. Patient was transitioned to p.o. Librium. Patient did well with symptoms resolving. Was discharged to acute rehab with Librium taper with goal to discharge to rehab    Consults. IP CONSULT TO HOSPITALIST  IP CONSULT TO PHYSICAL MEDICINE REHAB    Physical examination on discharge day. /77   Pulse 76   Temp 97.5 °F (36.4 °C) (Axillary)   Resp 17   Ht 6' 4\" (1.93 m)   Wt 242 lb 4.8 oz (109.9 kg)   SpO2 97%   BMI 29.49 kg/m²   General appearance. Alert. Looks comfortable. HEENT. Sclera clear. Moist mucus membranes. Cardiovascular. Regular rate and rhythm, normal S1, S2. No murmur. Respiratory. Not using accessory muscles. Clear to auscultation bilaterally, no wheeze. Gastrointestinal. Abdomen soft, non-tender, not distended, normal bowel sounds  Neurology. Facial symmetry. No speech deficits. Moving all extremities equally. Extremities. No edema in lower extremities. Skin. Warm, dry, normal turgor    Condition at time of discharge stable     Medication instructions provided to patient at discharge.      Medication List      START taking these medications    chlordiazePOXIDE 5 MG capsule  Commonly known as:  LIBRIUM  Take 1 capsule by mouth 3 times

## 2020-04-11 PROCEDURE — 6360000002 HC RX W HCPCS: Performed by: PHYSICAL MEDICINE & REHABILITATION

## 2020-04-11 PROCEDURE — 97530 THERAPEUTIC ACTIVITIES: CPT

## 2020-04-11 PROCEDURE — 97110 THERAPEUTIC EXERCISES: CPT

## 2020-04-11 PROCEDURE — 1280000000 HC REHAB R&B

## 2020-04-11 PROCEDURE — 97116 GAIT TRAINING THERAPY: CPT

## 2020-04-11 PROCEDURE — 97535 SELF CARE MNGMENT TRAINING: CPT

## 2020-04-11 PROCEDURE — 6370000000 HC RX 637 (ALT 250 FOR IP): Performed by: PHYSICAL MEDICINE & REHABILITATION

## 2020-04-11 RX ADMIN — TRAZODONE HYDROCHLORIDE 150 MG: 50 TABLET ORAL at 22:35

## 2020-04-11 RX ADMIN — ASPIRIN 81 MG: 81 TABLET, COATED ORAL at 09:00

## 2020-04-11 RX ADMIN — LISINOPRIL 20 MG: 20 TABLET ORAL at 09:10

## 2020-04-11 RX ADMIN — ESCITALOPRAM OXALATE 5 MG: 10 TABLET ORAL at 09:00

## 2020-04-11 RX ADMIN — FOLIC ACID 1 MG: 1 TABLET ORAL at 09:02

## 2020-04-11 RX ADMIN — ENOXAPARIN SODIUM 40 MG: 40 INJECTION SUBCUTANEOUS at 09:02

## 2020-04-11 RX ADMIN — POLYETHYLENE GLYCOL 3350 17 G: 17 POWDER, FOR SOLUTION ORAL at 15:15

## 2020-04-11 RX ADMIN — CHLORDIAZEPOXIDE HYDROCHLORIDE 5 MG: 5 CAPSULE ORAL at 09:00

## 2020-04-11 RX ADMIN — THERA TABS 1 TABLET: TAB at 09:00

## 2020-04-11 RX ADMIN — CHLORDIAZEPOXIDE HYDROCHLORIDE 5 MG: 5 CAPSULE ORAL at 22:35

## 2020-04-11 RX ADMIN — Medication 100 MG: at 09:02

## 2020-04-11 ASSESSMENT — PAIN DESCRIPTION - LOCATION: LOCATION: ABDOMEN

## 2020-04-11 ASSESSMENT — PAIN SCALES - GENERAL
PAINLEVEL_OUTOF10: 0
PAINLEVEL_OUTOF10: 6

## 2020-04-11 ASSESSMENT — PAIN DESCRIPTION - PAIN TYPE: TYPE: ACUTE PAIN

## 2020-04-11 NOTE — PROGRESS NOTES
Occupational Therapy  Facility/Department: [facility] ACUTE REHAB UNIT  Daily Treatment Note  NAME: Cruz Shipman  : 1955  MRN: [id number]    Date of Service: 2020    Discharge Recommendations:  Continue to assess pending progress  OT Equipment Recommendations  Equipment Needed: No  Other: potentially a shower chair pending progress- continue to assess for additional needs    Assessment   Performance deficits / Impairments: Decreased functional mobility ; Decreased ADL status; Decreased safe awareness;Decreased cognition;Decreased endurance;Decreased balance;Decreased high-level IADLs  Assessment: Pt is currently functioning below occupational baseline and demo the deficits listed above. Pt's decreased safety awareness is a limiting factor especially as patient fatigues requiring increased verbal cueing and education on energy conservation this date. Pt would benefit from continued skilled OT services to address these deficits and increase indpendence, safety, and ease with all occupational pursuits, cont POC. Treatment Diagnosis: Decreased ADLs, IADLs, transfers, mobility associated with alchohol withdrawal and abnormality of gait due to impairment of balance   OT Education: OT Role;Plan of Care;Transfer Training;Energy Conservation;Precautions  Patient Education: pt verbalized and demo understanding, however, had decreased carryover throughout session, would benefit from reinforcement. REQUIRES OT FOLLOW UP: Yes  Activity Tolerance  Activity Tolerance: Patient Tolerated treatment well  Safety Devices  Safety Devices in place: Yes  Type of devices: All fall risk precautions in place;Call light within reach; Chair alarm in place;Nurse notified; Left in chair;Patient at risk for falls         Patient Diagnosis(es): There were no encounter diagnoses. has a past medical history of Alcohol abuse.   has no past surgical history on file.     Restrictions  Restrictions/Precautions  Restrictions/Precautions: requesting to complete oral care and laundry, agreeable to OT treatment. Session initiated with pt completing LB dressing with CGA for steadying assist while pulling pants over hips; pt also donned footwear this date with set up. Pt completed sit<>stand transfer followed by functional mobility with use of RW with CGA. Next pt completed grooming (hand washing and oral care) in stance at sink with CGA, however, 1 LOB noted after ~5 minutes of standing d/t fatigue requiring min(A) to correct followed by a seated rest break. Pt stated \"I only fell backwards because I leaned my head back to gargle water\", pt  then educated on importance of energy conservation and verbalizing need for seated rest break when BLE fatigue, pt verbalized understanding, however, would benefit from reinforcement for increased carryover. Pt then completed functional mobility to laundry room with CGA and took one seated rest break prior to completing laundry task in stance with CGA. Then pt completed functional mobility to therapy gym and completed the following therapeutic exercise for increased upright posture and strength:  1) Scapular protraction/retraction 1x5  2) chest opening stretch over red therapy ball with arms externally rotated and slight abducted 1x5  3) seated on teofilo disc for increased core strength while completing 1x10 shoulder flexion and 1x10 shoulder abduction using body weight. While on teofilo disc, pt demo decreased sitting balance requiring verbal cues and intermittent periods of CGA to maintain midline. Pt completed functional mobility back to room and demo decreased eccentric control while completing stand<>sit transfer to recliner. Pt educated on transfer training and verbalized understanding, however, would benefit from reinforcement. Pt left in recliner chair, chair alarm on, call light near, lunch kuldip set up, and all needs met.       Plan   Plan  Times per week: 5-7x/wk; 90 minutes  Times per day: Daily  Current

## 2020-04-11 NOTE — PROGRESS NOTES
Physical Therapy  Facility/Department: [facility] ACUTE REHAB UNIT  Daily Treatment Note  NAME: Theodore Ontiveros  : 1955  MRN: [id number]    Date of Service: 2020    Discharge Recommendations:  Patient would benefit from continued therapy after discharge, S Level 3   PT Equipment Recommendations  Other: To be determined based on progress    Assessment   Body structures, Functions, Activity limitations: Decreased functional mobility ; Decreased ADL status; Decreased ROM; Decreased safe awareness;Decreased cognition;Decreased endurance;Decreased sensation;Decreased balance;Decreased coordination  Assessment: Pt remains with decreased insight into deficits and decreased safety awarenss. Pt continues to require assistive device for ambulation due to decreased balance and coordination. Treatment Diagnosis: decreased functional mobility, impaired gait, decreased balance  Prognosis: Good  PT Education: Goals;PT Role;Plan of Care;Transfer Training;General Safety;Gait Training  Patient Education: Pt verbalized understanding but would benefit from reinforcement  Barriers to Learning: Emotionally labile, cognition  REQUIRES PT FOLLOW UP: Yes  Activity Tolerance  Activity Tolerance: Patient Tolerated treatment well     Patient Diagnosis(es): debility. has a past medical history of Alcohol abuse.   has no past surgical history on file. Restrictions  Restrictions/Precautions  Restrictions/Precautions: Fall Risk  Required Braces or Orthoses?: No  Position Activity Restriction  Other position/activity restrictions: Theodore Ontiveros is a 62 y.o. male presents to the emergency department today with acute alcohol withdrawal.  The patient reports that [facility] with history of withdrawal seizure. The patient reports he last drank this morning. States that he generally drinks 4 tall vodka sodas each day.   He is tremulous, states that he is nauseated with some diarrhea body aches headache and increased sensorium. To ARU 4/9/20  Subjective   General  Chart Reviewed: Yes  Response To Previous Treatment: Patient with no complaints from previous session. Family / Caregiver Present: No  Subjective  Subjective: Pt sitting in bedside chair on arrival.  States he feels good after getting cleaned up. Pain Screening  Patient Currently in Pain: Denies  Vital Signs  Patient Currently in Pain: Denies       Orientation     Cognition      Objective      Transfers  Sit to Stand: Contact guard assistance  Stand to sit: Minimal Assistance(decreased eccentric control)  Ambulation  Ambulation?: Yes  Ambulation 1  Surface: level tile  Device: Rolling Walker  Assistance: Contact guard assistance  Quality of Gait: unsteady, decreased foot clearance L > R, decreased DF bialterally, slow erna  Distance: 175'     Balance  Posture: Good  Sitting - Static: Good  Sitting - Dynamic: Good  Standing - Static: Poor  Standing - Dynamic: Poor            Comment: 1st session:  Performed ambulation as documented above. Performed alternating toe taps on 6\" step with RW for balance assist X 10. Performed LE lateral taps on 6\" step with RW for balance assist X 10. Performed STS without UE support X 5 with CGA with vc for appropriate sequencing of muscle activation. Performed seated ball rolls X 10 forward/backward/CW/CCW. Performed seated stepper X 5 minutes. Pt returned to room and remained in bedside chair with alarm on and all needs in reach.               G-Code     OutComes Score                                                     AM-PAC Score             Goals  Short term goals  Short term goal 1: Pt will be Independent with all bed mobility  Short term goal 2: Pt will be  Modified independent with AAD for transfers  Short term goal 3: Pt will Ambulate 200' with LRAD and Modified independence  Short term goal 4: Pt will ascend/descend 4 steps with Mod I  Long term goals  Time Frame for Long term goals :

## 2020-04-11 NOTE — PROGRESS NOTES
Appointment made, mom informed of date and time    Mom Confirmed understanding     Informed mom to have patient bring all paperwork that needs to be signed for [employer] to visit     No further questions    Physical Therapy  Facility/Department: [facility] ACUTE REHAB UNIT  Daily Treatment Note  NAME: Holli Corbin  : 1955  MRN: [id number]    Date of Service: 2020    Discharge Recommendations:  Patient would benefit from continued therapy after discharge, S Level 3   PT Equipment Recommendations  Equipment Needed: No  Other: To be determined based on progress    Assessment   Body structures, Functions, Activity limitations: Decreased functional mobility ; Decreased ADL status; Decreased ROM; Decreased safe awareness;Decreased cognition;Decreased endurance;Decreased sensation;Decreased balance;Decreased coordination  Assessment: Patient improving balance and ambulation but needs recurrent reiteration for safety. Treatment Diagnosis: decreased functional mobility, impaired gait, decreased balance  Prognosis: Good  Decision Making: Low Complexity  Clinical Presentation: evolving  PT Education: Goals;PT Role;Plan of Care;Transfer Training;General Safety;Gait Training  Patient Education: Pt verbalized understanding but would benefit from reinforcement  Barriers to Learning: Emotionally labile, cognition  REQUIRES PT FOLLOW UP: Yes     Patient Diagnosis(es): There were no encounter diagnoses. has a past medical history of Alcohol abuse.   has no past surgical history on file. Restrictions  Restrictions/Precautions  Restrictions/Precautions: Fall Risk  Required Braces or Orthoses?: No  Position Activity Restriction  Other position/activity restrictions: Holli Corbin is a 62 y.o. male presents to the emergency department today with acute alcohol withdrawal.  The patient reports that he was sent over from [facility] [place] for concern of alcohol withdrawal with history of withdrawal seizure. The patient reports he last drank this morning. States that he generally drinks 4 tall vodka sodas each day. He is tremulous, states that he is nauseated with some diarrhea body aches headache and increased sensorium.  To ARU 4/9/20     Subjective   General  Chart Reviewed: Yes  Response To Previous Treatment: Patient with no complaints from previous session. Family / Caregiver Present: No  Subjective  Subjective: Patient reports he is very happy with the care he has received. General Comment  Comments: Patient sitting in chair. Pain Screening  Patient Currently in Pain: Denies  Vital Signs  Patient Currently in Pain: Denies       Orientation  Orientation  Overall Orientation Status: Within Functional Limits     Objective   Transfers  Sit to Stand: Contact guard assistance  Stand to sit: Minimal Assistance  Bed to Chair: Minimal assistance  Stand Pivot Transfers: Minimal Assistance  Ambulation  Ambulation?: Yes  More Ambulation?: No  Ambulation 1  Surface: level tile  Device: Single point cane  Assistance: Contact guard assistance  Quality of Gait: unsteady, decreased foot clearance L > R, decreased DF bilaterally, slow erna  Distance: 182', 182'  Comments: Unsteady w/ gait, ataxic. Balance  Posture: Good  Sitting - Static: Good  Sitting - Dynamic: Good  Standing - Static: Fair;-  Standing - Dynamic: Poor;+      AROM RLE (degrees)  RLE AROM: WNL  AROM LLE (degrees)  LLE AROM : WNL  Strength RLE  Strength RLE: WFL  Strength LLE  Strength LLE: WFL  Comment: 1st session:  Performed ambulation as documented above. Performed alternating toe taps on 6\" step with RW for balance assist X 10. Performed LE lateral taps on 6\" step with RW for balance assist X 10. Performed STS without UE support X 5 with CGA with vc for appropriate sequencing of muscle activation. Performed seated ball rolls X 10 forward/backward/CW/CCW. Performed seated stepper X 5 minutes. Pt returned to room and remained in bedside chair with alarm on and all needs in reach. Goals  Short term goals  Time Frame for Short term goals:  To be met prior to discharge  Short term goal 1: Pt will be Independent with all bed mobility  Short term goal 2: Pt will be Modified independent with AAD for transfers  Short term goal 3: Pt will Ambulate 200' with LRAD and Modified independence  Short term goal 4: Pt will ascend/descend 4 steps with Mod I  Long term goals  Time Frame for Long term goals : STG = LTGs  Patient Goals   Patient goals : \"to get walking better and using the cane again\"    Plan    Plan  Times per week: 90 minutes on 5 days/week  Times per day: Daily  Current Treatment Recommendations: Strengthening, ROM, Balance Training, Functional Mobility Training, Endurance Training, Transfer Training, Gait Training, Stair training, Neuromuscular Re-education, Safety Education & Training, Patient/Caregiver Education & Training, Equipment Evaluation, Education, & procurement, Manual Therapy - Soft Tissue Mobilization  Safety Devices  Type of devices: Call light within reach, Chair alarm in place, All fall risk precautions in place, Gait belt, Patient at risk for falls, Left in chair, Nurse notified  Restraints  Initially in place: No     Therapy Time   Individual Concurrent Group Co-treatment   Time In 1245         Time Out 1330         Minutes 45         Timed Code Treatment Minutes: [address], [address] [facility], DPT, ATC-R [id number]

## 2020-04-12 PROCEDURE — 6360000002 HC RX W HCPCS: Performed by: PHYSICAL MEDICINE & REHABILITATION

## 2020-04-12 PROCEDURE — 1280000000 HC REHAB R&B

## 2020-04-12 PROCEDURE — 6370000000 HC RX 637 (ALT 250 FOR IP): Performed by: PHYSICAL MEDICINE & REHABILITATION

## 2020-04-12 RX ADMIN — ENOXAPARIN SODIUM 40 MG: 40 INJECTION SUBCUTANEOUS at 09:31

## 2020-04-12 RX ADMIN — FOLIC ACID 1 MG: 1 TABLET ORAL at 09:32

## 2020-04-12 RX ADMIN — THERA TABS 1 TABLET: TAB at 09:33

## 2020-04-12 RX ADMIN — CHLORDIAZEPOXIDE HYDROCHLORIDE 5 MG: 5 CAPSULE ORAL at 22:22

## 2020-04-12 RX ADMIN — ASPIRIN 81 MG: 81 TABLET, COATED ORAL at 09:32

## 2020-04-12 RX ADMIN — LISINOPRIL 20 MG: 20 TABLET ORAL at 09:33

## 2020-04-12 RX ADMIN — POLYETHYLENE GLYCOL 3350 17 G: 17 POWDER, FOR SOLUTION ORAL at 10:00

## 2020-04-12 RX ADMIN — TRAZODONE HYDROCHLORIDE 150 MG: 50 TABLET ORAL at 22:21

## 2020-04-12 RX ADMIN — Medication 100 MG: at 09:32

## 2020-04-12 RX ADMIN — CHLORDIAZEPOXIDE HYDROCHLORIDE 5 MG: 5 CAPSULE ORAL at 09:40

## 2020-04-12 RX ADMIN — ESCITALOPRAM OXALATE 5 MG: 10 TABLET ORAL at 09:32

## 2020-04-12 ASSESSMENT — PAIN SCALES - GENERAL
PAINLEVEL_OUTOF10: 0
PAINLEVEL_OUTOF10: 0
PAINLEVEL_OUTOF10: 7
PAINLEVEL_OUTOF10: 0
PAINLEVEL_OUTOF10: 7
PAINLEVEL_OUTOF10: 0

## 2020-04-12 ASSESSMENT — PAIN DESCRIPTION - PAIN TYPE
TYPE: NEUROPATHIC PAIN
TYPE: NEUROPATHIC PAIN

## 2020-04-12 ASSESSMENT — PAIN DESCRIPTION - ORIENTATION
ORIENTATION: RIGHT;LEFT
ORIENTATION: RIGHT;LEFT

## 2020-04-12 ASSESSMENT — PAIN DESCRIPTION - LOCATION
LOCATION: FOOT
LOCATION: FOOT

## 2020-04-12 ASSESSMENT — PAIN SCALES - WONG BAKER
WONGBAKER_NUMERICALRESPONSE: 0
WONGBAKER_NUMERICALRESPONSE: 0

## 2020-04-13 LAB
ANION GAP SERPL CALCULATED.3IONS-SCNC: 12 MMOL/L (ref 3–16)
BUN BLDV-MCNC: 10 MG/DL (ref 7–20)
CALCIUM SERPL-MCNC: 9.5 MG/DL (ref 8.3–10.6)
CHLORIDE BLD-SCNC: 102 MMOL/L (ref 99–110)
CO2: 27 MMOL/L (ref 21–32)
CREAT SERPL-MCNC: 0.7 MG/DL (ref 0.8–1.3)
GFR AFRICAN AMERICAN: >60
GFR NON-AFRICAN AMERICAN: >60
GLUCOSE BLD-MCNC: 100 MG/DL (ref 70–99)
GLUCOSE BLD-MCNC: 90 MG/DL (ref 70–99)
GLUCOSE BLD-MCNC: 99 MG/DL (ref 70–99)
HCT VFR BLD CALC: 46.3 % (ref 40.5–52.5)
HEMOGLOBIN: 15.7 G/DL (ref 13.5–17.5)
MCH RBC QN AUTO: 35.4 PG (ref 26–34)
MCHC RBC AUTO-ENTMCNC: 33.9 G/DL (ref 31–36)
MCV RBC AUTO: 104.4 FL (ref 80–100)
PDW BLD-RTO: 13.9 % (ref 12.4–15.4)
PERFORMED ON: ABNORMAL
PERFORMED ON: NORMAL
PLATELET # BLD: 203 K/UL (ref 135–450)
PMV BLD AUTO: 7.2 FL (ref 5–10.5)
POTASSIUM SERPL-SCNC: 4.3 MMOL/L (ref 3.5–5.1)
RBC # BLD: 4.43 M/UL (ref 4.2–5.9)
SODIUM BLD-SCNC: 141 MMOL/L (ref 136–145)
WBC # BLD: 5.7 K/UL (ref 4–11)

## 2020-04-13 PROCEDURE — 85027 COMPLETE CBC AUTOMATED: CPT

## 2020-04-13 PROCEDURE — 97535 SELF CARE MNGMENT TRAINING: CPT

## 2020-04-13 PROCEDURE — 97530 THERAPEUTIC ACTIVITIES: CPT

## 2020-04-13 PROCEDURE — 36415 COLL VENOUS BLD VENIPUNCTURE: CPT

## 2020-04-13 PROCEDURE — 1280000000 HC REHAB R&B

## 2020-04-13 PROCEDURE — 97032 APPL MODALITY 1+ESTIM EA 15: CPT

## 2020-04-13 PROCEDURE — 6370000000 HC RX 637 (ALT 250 FOR IP): Performed by: PHYSICAL MEDICINE & REHABILITATION

## 2020-04-13 PROCEDURE — 97110 THERAPEUTIC EXERCISES: CPT

## 2020-04-13 PROCEDURE — 94760 N-INVAS EAR/PLS OXIMETRY 1: CPT

## 2020-04-13 PROCEDURE — 80048 BASIC METABOLIC PNL TOTAL CA: CPT

## 2020-04-13 PROCEDURE — 97116 GAIT TRAINING THERAPY: CPT

## 2020-04-13 RX ADMIN — ESCITALOPRAM OXALATE 5 MG: 10 TABLET ORAL at 09:58

## 2020-04-13 RX ADMIN — Medication 100 MG: at 09:58

## 2020-04-13 RX ADMIN — THERA TABS 1 TABLET: TAB at 09:59

## 2020-04-13 RX ADMIN — FOLIC ACID 1 MG: 1 TABLET ORAL at 09:59

## 2020-04-13 RX ADMIN — CHLORDIAZEPOXIDE HYDROCHLORIDE 5 MG: 5 CAPSULE ORAL at 18:53

## 2020-04-13 RX ADMIN — TRAZODONE HYDROCHLORIDE 150 MG: 50 TABLET ORAL at 22:37

## 2020-04-13 RX ADMIN — LISINOPRIL 20 MG: 20 TABLET ORAL at 09:59

## 2020-04-13 RX ADMIN — ASPIRIN 81 MG: 81 TABLET, COATED ORAL at 09:59

## 2020-04-13 ASSESSMENT — PAIN SCALES - GENERAL: PAINLEVEL_OUTOF10: 0

## 2020-04-13 NOTE — PATIENT CARE CONFERENCE
[facility]  Inpatient Rehabilitation  Weekly Team Conference Note    Patient Name: Theodore Ontiveros        MRN: [id number]    : 1955  (62 y.o.)  Gender: male      Diagnosis: Debility    The team conference for this patient was held on 20 at 11:00am by:  Carlos Vu MD    CASE MANAGEMENT:  Assessment: Patient lives in an apartment typically. No home care. Pt admitted from [address] for inpt ETOH tx, to return there at discharge. PSYCHOLOGY:  Assessment:     PHYSICAL THERAPY:    Bed Mobility:        Transfers:  Sit to Stand: Contact guard assistance  Stand to sit: Contact guard assistance  Bed to Chair: Minimal assistance    Ambulation 1  Surface: level tile  Device: Rolling Walker  Assistance: Contact guard assistance  Quality of Gait: Decreased foot clearance L> R; improved erna with RW  Distance: 390' including 180 degree turn; 175'  Comments: .          QM:  Roll Left and Right  Assistance Needed: Independent  CARE Score: 6  Discharge Goal: Independent  Sit to Lying  Assistance Needed: Independent  CARE Score: 6  Discharge Goal: Independent  Lying to Sitting on Side of Bed  Assistance Needed: Independent  CARE Score: 6  Discharge Goal: Independent  Sit to Stand  Assistance Needed: Supervision or touching assistance  CARE Score: 4  Discharge Goal: Independent  Chair/Bed-to-Chair Transfer  Assistance Needed: Partial/moderate assistance  CARE Score: 3  Discharge Goal: Independent  Car Transfer  Assistance Needed: Partial/moderate assistance  CARE Score: 3  Discharge Goal: Independent  Walk 10 Feet  Assistance Needed: Partial/moderate assistance  CARE Score: 3  Discharge Goal: Partial/moderate assistance  Walk 50 Feet with Two Turns  Assistance Needed: Partial/moderate assistance  CARE Score: 3  Discharge Goal: Independent  Walk 150 Feet  Assistance Needed: Partial/moderate assistance  CARE Score: 3  Discharge Goal: Independent  Walking 10 Feet on Uneven Surfaces  Reason

## 2020-04-13 NOTE — PROGRESS NOTES
Occupational Therapy  Facility/Department: [facility] ACUTE REHAB UNIT  Daily Treatment Note  NAME: Jase Villa  : 1955  MRN: [id number]    Date of Service: 2020    Discharge Recommendations:  S Level 3, Home with Home health OT  OT Equipment Recommendations  Equipment Needed: Yes  Mobility Devices: ADL Assistive Devices  ADL Assistive Devices: Shower Chair with back; Hand-held Shower    Assessment   Performance deficits / Impairments: Decreased functional mobility ; Decreased ADL status; Decreased safe awareness;Decreased cognition;Decreased endurance;Decreased balance;Decreased high-level IADLs  Assessment: Pt is currently functioning below occupational baseline and demos the deficits listed above. Pt's decreased safety awareness and fatigue is a limiting factor with pt requiring increased verbal cueing and education on energy conservation this date. Pt would benefit from continued skilled OT services to address these deficits and increase indpendence, safety, and ease with all occupational pursuits, cont POC. Treatment Diagnosis: Decreased ADLs, IADLs, transfers, mobility associated with alchohol withdrawal and abnormality of gait due to impairment of balance   Prognosis: Good  OT Education: OT Role;Plan of Care;Energy Conservation;Transfer Training;ADL Adaptive Strategies  Patient Education: pt verbalized and demo understanding, however, had decreased carryover throughout session, pt would benefit from reinforcement. REQUIRES OT FOLLOW UP: Yes  Activity Tolerance  Activity Tolerance: Patient Tolerated treatment well;Patient limited by fatigue  Activity Tolerance: pt reported increased fatigue throughout session  Safety Devices  Safety Devices in place: Yes  Type of devices: Left in chair;Chair alarm in place;Call light within reach; All fall risk precautions in place         Patient Diagnosis(es): debility     has a past medical history of Alcohol abuse.   has no past surgical history on to/from bathroom for ADL completion. UB/LB bathing and dressing completed seated on shower chair w/ use of grab bar during stances. cues for safety and use of energy conervation. Grooming completed in stance w/ RW for support as needed. Balance  Sitting Balance: Supervision  Standing Balance: Contact guard assistance  Standing Balance  Time: ~12mins total  Activity: functional mobility, stances for ADLs, transfers  Comment: RW, use of grab bars, no LOB noted  Functional Mobility  Functional - Mobility Device: Rolling Walker  Activity: To/from bathroom  Assist Level: Contact guard assistance  Functional Mobility Comments: verbal cues for RW management/safety  Shower Transfers  Shower - Transfer From: Walker  Shower - Transfer Type: To and From  Shower - Transfer To: Shower seat with back  Shower - Technique: Ambulating  Shower Transfers: Contact Guard  Shower Transfers Comments: use of grab bar     Transfers  Sit to stand: Contact guard assistance  Stand to sit: Contact guard assistance  Transfer Comments: pt educated on safe hand placements and use of RW, pt demonstrated impulsivity with sit<>stands during session requiring cues for safety. Cognition  Arousal/Alertness: Appropriate responses to stimuli  Following Commands: Follows one step commands consistently  Attention Span: Difficulty dividing attention; Attends with cues to redirect  Memory: Decreased short term memory;Decreased recall of precautions  Safety Judgement: Decreased awareness of need for assistance;Decreased awareness of need for safety  Insights: Decreased awareness of deficits  Cognition Comment: Acknowledges poor balance and recent fall as well as additional near falls related to LOB but asks to remove all alarms that are in place for safety. Second Session:  Pt in recliner chair upon entry, agreeable to OT treatment session.  Session initiated with completion of the parth cognitive assessment scoring 24/30 demo the most apparent deficits in the memory/delayed recall section recalling 0/5 words. Session ended with patient completing the following UE exercises 3x10 using 3# to increase strength and endurance needed to complete ADLs:  1) shoulder flexion  2) shoulder press  3) chest press  4) horizontal abduction   5) overhead passes 3x20 using red therapy ball at a fast pace to increase endurance  Pt required seated rest break in between before starting a new set. Pt denied any ADL needs and left in recliner, chair alarm on, call light near and all needs met. Plan   Plan  Times per week: 5-7x/wk; 90 minutes  Times per day: Daily  Current Treatment Recommendations: Strengthening, Balance Training, Functional Mobility Training, Endurance Training, Neuromuscular Re-education, Safety Education & Training, Patient/Caregiver Education & Training, Self-Care / ADL, Home Management Training, Equipment Evaluation, Education, & procurement       Goals  Short term goals  Time Frame for Short term goals: 7 days   Short term goal 1: Pt will complete all functional transfers at MOD I level- not met, progressing  Short term goal 2: Pt will complete UB/LB dressing at MOD I level- not met, progressing  Short term goal 3: Pt will complete UB/LB bathing at MOD I level- not met, progressing  Short term goal 4: Pt will complete light IADL task with supervision- not met, progressing  Short term goal 5: Pt will tolerate at least 10 minutes in stance with supervision assist while completing functional task- not met, progressing  Long term goals  Time Frame for Long term goals : LTG=STG  Patient Goals   Patient goals : increase balance and functional mobility.  Gain independence back        Therapy Time   Individual Concurrent Group Co-treatment   Time In 1023         Time Out 1103         Minutes 40              Timed Code Treatment Minutes:  40  Total Treatment Minutes:  40      Second Session Therapy Time:   Individual

## 2020-04-13 NOTE — PROGRESS NOTES
is tremulous, states that he is nauseated with some diarrhea body aches headache and increased sensorium. To ARU 4/9/20     Subjective   General  Chart Reviewed: Yes  Response To Previous Treatment: Patient with no complaints from previous session. Family / Caregiver Present: No  Subjective  Subjective: Pt c/o abdominal pain. Rates pain 4/10; had Tylenol for it. General Comment  Comments: Patient sitting in chair. Orientation  Orientation  Overall Orientation Status: Within Functional Limits  Cognition   Cognition  Arousal/Alertness: Appropriate responses to stimuli  Following Commands: Follows one step commands consistently  Attention Span: Difficulty dividing attention; Attends with cues to redirect  Memory: Decreased short term memory;Decreased recall of precautions  Safety Judgement: Decreased awareness of need for assistance;Decreased awareness of need for safety  Problem Solving: Decreased awareness of errors;Assistance required to generate solutions;Assistance required to identify errors made;Assistance required to implement solutions  Insights: Decreased awareness of deficits  Initiation: Does not require cues  Sequencing: Does not require cues  Cognition Comment: Acknowledges poor balance and recent fall as well as additional near falls related to LOB but asks to remove all alarms that are in place for safety. Objective   Transfers  Sit to Stand: Contact guard assistance  Stand to sit: Contact guard assistance  Stand Pivot Transfers: Contact guard assistance  Lateral Transfers: Contact guard assistance  Ambulation  Ambulation?: Yes  Ambulation 1  Surface: level tile  Device: Rolling Walker  Assistance: Contact guard assistance  Quality of Gait: Decreased foot clearance L> R; improved erna with RW  Distance: 390' including 180 degree turn; 175'     1st session; Ambulation as above. Seated steppper x 8 minutes for reciprocal patterns; difficulty maintaining heel placement.   Standing slant board stretch 2 x 60 seconds each LE. Attempted sit to stands with feet on foam; pt unable to acheive upright without heavy reliance on UEs. Performed Sit to stand x 5 without use of UES with min A for forward cue and to prevent rapid descent due to decresaed eccentric control. Performed standing on foam balance activity in parallel bars. Pt was able to recognize when he had posterior wt shift but required vc & min A to correct. Returned to room and left up in chair with breakfast.    2nd session: Pt up in chair; agreeable to PT. Ambulated 175' with RW and CGA. PT performed [place] Stim to DF musculature each LE at 100mA on 10 off 50 x 20 minutes with mild activation. Performed seated ankle ROM on Wobble board each LE with TC for positioning. Performed standing gastroc stretch on slant board. Performed static single leg stance on teofilo disc with BUE support. Ambulated back to room with RW, focusing on heel strike but with continued slap foot. Left up in chair with Chair alarm activated, call light and needs in reach         Goals  Short term goals  Time Frame for Short term goals:  To be met prior to discharge  Short term goal 1: Pt will be Independent with all bed mobility  Short term goal 2: Pt will be  Modified independent with AAD for transfers  Short term goal 3: Pt will Ambulate 200' with LRAD and Modified independence  Short term goal 4: Pt will ascend/descend 4 steps with Mod I  Long term goals  Time Frame for Long term goals : STG = LTGs  Patient Goals   Patient goals : \"to get walking better and using the cane again\"    Plan    Plan  Times per week: 90 minutes on 5 days/week  Times per day: Daily  Current Treatment Recommendations: Strengthening, ROM, Balance Training, Functional Mobility Training, Endurance Training, Transfer Training, Gait Training, Stair training, Neuromuscular Re-education, Safety Education & Training, Patient/Caregiver Education & Training, Equipment Evaluation, Education, &

## 2020-04-14 PROCEDURE — 94760 N-INVAS EAR/PLS OXIMETRY 1: CPT

## 2020-04-14 PROCEDURE — 6370000000 HC RX 637 (ALT 250 FOR IP): Performed by: PHYSICAL MEDICINE & REHABILITATION

## 2020-04-14 PROCEDURE — 97116 GAIT TRAINING THERAPY: CPT

## 2020-04-14 PROCEDURE — 97110 THERAPEUTIC EXERCISES: CPT

## 2020-04-14 PROCEDURE — 92523 SPEECH SOUND LANG COMPREHEN: CPT

## 2020-04-14 PROCEDURE — 97530 THERAPEUTIC ACTIVITIES: CPT

## 2020-04-14 PROCEDURE — 97112 NEUROMUSCULAR REEDUCATION: CPT

## 2020-04-14 PROCEDURE — 97535 SELF CARE MNGMENT TRAINING: CPT

## 2020-04-14 PROCEDURE — 1280000000 HC REHAB R&B

## 2020-04-14 RX ADMIN — THERA TABS 1 TABLET: TAB at 08:47

## 2020-04-14 RX ADMIN — Medication 100 MG: at 08:47

## 2020-04-14 RX ADMIN — ASPIRIN 81 MG: 81 TABLET, COATED ORAL at 08:47

## 2020-04-14 RX ADMIN — ESCITALOPRAM OXALATE 5 MG: 10 TABLET ORAL at 08:47

## 2020-04-14 RX ADMIN — TRAZODONE HYDROCHLORIDE 150 MG: 50 TABLET ORAL at 23:47

## 2020-04-14 RX ADMIN — CHLORDIAZEPOXIDE HYDROCHLORIDE 5 MG: 5 CAPSULE ORAL at 08:47

## 2020-04-14 RX ADMIN — FOLIC ACID 1 MG: 1 TABLET ORAL at 08:47

## 2020-04-14 RX ADMIN — LISINOPRIL 20 MG: 20 TABLET ORAL at 08:48

## 2020-04-14 RX ADMIN — CHLORDIAZEPOXIDE HYDROCHLORIDE 5 MG: 5 CAPSULE ORAL at 01:24

## 2020-04-14 RX ADMIN — POLYETHYLENE GLYCOL 3350 17 G: 17 POWDER, FOR SOLUTION ORAL at 08:47

## 2020-04-14 ASSESSMENT — PAIN - FUNCTIONAL ASSESSMENT: PAIN_FUNCTIONAL_ASSESSMENT: 0-10

## 2020-04-14 ASSESSMENT — PAIN SCALES - GENERAL: PAINLEVEL_OUTOF10: 0

## 2020-04-14 NOTE — PROGRESS NOTES
Within functional limits     Objective: Auditory Comprehension  Comprehension: Within Functional Limits    Expression  Primary Mode of Expression: Verbal    Verbal Expression  Verbal Expression: Within functional limits    Written Expression  Dominant Hand: Right  Written Expression: Within Functional Limits    Motor Speech  Motor Speech: Within Functional Limits    Pragmatics/Social Functioning  Pragmatics: Within functional limits    Cognition:      Orientation  Overall Orientation Status: Within Functional Limits  Attention  Attention: Within Functional Limits  Memory  Memory: Exceptions to [facility]  People Encountered: Mild  Short-term Memory: Mild  Problem Solving  Problem Solving: Exceptions to [facility]  Managing Finances: To be assessed in therapy  Managing Medications: To be assessed in therapy  Numeric Reasoning  Numeric Reasoning: Exceptions to [facility]   Calculations: To be assessed in therapy  Money Management: To be assessed in therapy  Abstract Reasoning  Abstract Reasoning: Exceptions to [facility]  Convergent Thinking: To be assessed in therapy  Divergent Thinking: Mild  Safety/Judgement  Safety/Judgement: Exceptions to [facility]  Insight: Mild    Flexibility of Thought: Mild    Additional Assessments:  Pt was administered the Cognitive Linguistic Quick Test (CLQT) with the following results:    Attention- WNL   Memory- WNL  Executive Functions- WNL  Language- WNL  Visuospatial Skills- WNL  Composite Severity Rating- WNL  Clock Drawing Severity Rating- WNL    * Despite pt scoring WNL on composite rating and corresponding cognitive domains recommend speech therapy to address speech/ cognitive concerns as pt reports not being back to baseline level.  *     Prognosis:  Speech Therapy Prognosis  Prognosis: Good  Prognosis Considerations: Participation Level;Previous Level of Function  Individuals consulted  Consulted and agree with results and recommendations: Patient    Education:  Patient Education: Role of SLP, results of

## 2020-04-14 NOTE — PROGRESS NOTES
Dereje Jasso  4/14/2020  [id number]    Note left incomplete in error. Evaluation and management performed on the date documented below. Chief Complaint: Abnormality of gait due to impairment of balance    Subjective:   No overnight events. Still concerned about his balance. Therapy reporting he remains a significant fall risk. Falling backwards with multiple required corrections to prevent falling. ROS: No CP, SOB, dyspnea    Objective:  Patient Vitals for the past 24 hrs:   BP Temp Temp src Pulse Resp SpO2   04/14/20 0840 130/76 97.8 °F (36.6 °C) Oral 87 16 96 %   04/13/20 2230 124/71 97.6 °F (36.4 °C) Oral 68 18 98 %   04/13/20 1000 115/74 97.9 °F (36.6 °C) Oral 81 18 95 %     Gen: No distress, pleasant. Resting in chair  HEENT: Normocephalic, atraumatic  CV: Regular rate and rhythm. No MRG   Resp: No respiratory distress. CTAB   Abd: Soft, nontender nondistended  Ext: No edema. Neuro: Alert, oriented, appropriately interactive. Decreased sensation in BLE    Laboratory data: Available via EMR. Therapy progress:  PT  Position Activity Restriction  Other position/activity restrictions: Dereje Jasso is a 62 y.o. male presents to the emergency department today with acute alcohol withdrawal.  The patient reports that he was sent over from [place] for concern of alcohol withdrawal with history of withdrawal seizure. The patient reports he last drank this morning. States that he generally drinks 4 tall vodka sodas each day. He is tremulous, states that he is nauseated with some diarrhea body aches headache and increased sensorium. To ARU 4/9/20  Objective     Sit to Stand: Contact guard assistance  Stand to sit: Contact guard assistance  Bed to Chair: Minimal assistance  Device: Rolling Walker  Assistance: Contact guard assistance  Distance: 390' including 180 degree turn; 175'  OT  PT Equipment Recommendations  Equipment Needed: No  Other:  To be determined based on progress  Toilet - Technique: Ambulating  Equipment Used: Standard toilet  Toilet Transfers Comments: heavy use of handrail decreased control on descent   Assessment        SLP                Body mass index is 28.58 kg/m². Assessment:  Patient Active Problem List   Diagnosis    Alcohol withdrawal, uncomplicated ([facility] [address]    Essential hypertension    Tremor    Hypophosphatemia    Abnormality of gait due to impairment of balance       Plan:   Alcohol withdrawal: Weaned from Precedex. Continue PRN Librium.  PT/OT    Decreased cognition: poor memory, SLP     HTN: lisinopril 20     Insomnia: trazodone 150     Adjustement disorder: Started lexapro      Anxiety: would benefit from OP CBT     Bowels: Per protocol  Bladder: Per protocol   Sleep: Trazodone provided prn. Pain: tylenol   DVT PPx: ambulating >300'    Team conference was held today on the patient and discussed directly with the patient utilizing their entire treatment team. Please see separate team note for details. Total treatment time for today's care >34 min. Neno Canales MD 4/14/2020, 9:53 AM    * This document was created using dictation software. While all precautions were taken to ensure accuracy, errors may have occurred. Please disregard any typographical errors.

## 2020-04-14 NOTE — PROGRESS NOTES
Occupational Therapy  Facility/Department: [facility] ACUTE REHAB UNIT  Daily Treatment Note  NAME: Ashlee Griffith  : 1955  MRN: [id number]    Date of Service: 2020    Discharge Recommendations:  S Level 3, Home with Home health OT  OT Equipment Recommendations  Equipment Needed: Yes  Mobility Devices: ADL Assistive Devices  ADL Assistive Devices: Shower Chair with back; Hand-held Shower;Transfer Tub Bench  Other: pt may benefit from a shower chair or TTB- continue to assess for additional needs    Assessment   Performance deficits / Impairments: Decreased functional mobility ; Decreased ADL status; Decreased safe awareness;Decreased cognition;Decreased endurance;Decreased balance;Decreased high-level IADLs  Assessment: Pt is currently functioning below occupational baseline and demos the deficits listed above. Pt presents with decreased safety awareness requiring increased verbal cueing and education on functional activity/mobility safety. Pt demonstrated increased activity tolerance this date. Pt would benefit from continued skilled OT services to address these deficits and increase indpendence, safety, and ease with all occupational pursuits, cont POC. Treatment Diagnosis: Decreased ADLs, IADLs, transfers, mobility associated with alchohol withdrawal and abnormality of gait due to impairment of balance   Prognosis: Good  OT Education: OT Role;Plan of Care;Energy Conservation;Transfer Training  Patient Education: Pt requires continued education on safety/transfers/energy conservation d/t poor carryover   REQUIRES OT FOLLOW UP: Yes  Activity Tolerance  Activity Tolerance: Patient Tolerated treatment well  Safety Devices  Safety Devices in place: Yes  Type of devices: Left in chair;Call light within reach; Chair alarm in place; All fall risk precautions in place         Patient Diagnosis(es): Debility     has a past medical history of Alcohol abuse.   has no past surgical history on from suitcase, pt completed all grooming and UB dressing in stance, 3 LOB noted w/ CGA to correct, pt encouraged to steady himself on counter top of sink for stability during tasks        Balance  Sitting Balance: Supervision  Standing Balance: Contact guard assistance  Standing Balance  Time: 16mins + 5mins   Activity: functional mobility within room, ADL completion, TE in stance  Comment: RW, multiple LOB this session w/ CGA to correct during ADLs and CGA-Elaine to correct during TE  Functional Mobility  Functional - Mobility Device: Rolling Walker  Activity: To/from bathroom  Assist Level: Contact guard assistance  Functional Mobility Comments: cues for RW management and safety  Bed mobility  Supine to Sit: Supervision(HOB elevated, use of bed rail)  Transfers  Sit to stand: Contact guard assistance  Stand to sit: Contact guard assistance  Transfer Comments: CGA-SBA w/ occasional cues for hand placement and RW safety, pt demonstrated variable carryover of education on transfer training/safety           Cognition  Overall Cognitive Status: Exceptions  Memory: Decreased short term memory;Decreased recall of precautions  Safety Judgement: Decreased awareness of need for assistance;Decreased awareness of need for safety  Insights: Decreased awareness of deficits                    Type of ROM/Therapeutic Exercise  Type of ROM/Therapeutic Exercise: AROM  Comment: B UE AROM in stance w/ 5# weighted ball in order to address activity tolerance and balance for functional tasks, 12 reps x3 of elbow flexion, shoulder flexion, and chest press, Elaine-CGA for balance throughout, seated rest break taken after 2 reps     Second visit:      Patient up in chair and in good spirits. Patient ambulated from room to bathroom with tub about 100 feet with rw and contact guard to prevent LOB backwards. Patient then completed tub transfer with use of tub bench with stand by assistance/ verbal cues to motor plan.  Patient then ambulated back

## 2020-04-14 NOTE — PROGRESS NOTES
each day. He is tremulous, states that he is nauseated with some diarrhea body aches headache and increased sensorium. To ARU 4/9/20     Subjective   General  Chart Reviewed: Yes  Response To Previous Treatment: Patient with no complaints from previous session. Family / Caregiver Present: No  Subjective  Subjective: No c/o; agreeable to PT  General Comment  Comments: Pt up in chair upon arrival  Pain Screening  Patient Currently in Pain: Denies       Orientation  Orientation  Overall Orientation Status: Within Functional Limits     Objective   Transfers  Sit to Stand: Contact guard assistance  Stand to sit: Contact guard assistance  Stand Pivot Transfers: Contact guard assistance  Lateral Transfers: Contact guard assistance    Ambulation 1  Surface: level tile  Device: Rolling Walker  Assistance: Contact guard assistance  Quality of Gait: Improved erna, decreased heel strike bilaterally  Distance: 440' x 1, 170' x 1      1st session: Ambulated as above. Performed sit to stand x 5 with hands in midline; pt requires verbal and TC to wt shift forward and to control eccentric descent. Performed dyamic balance on foam; pt unable to maintain position when performing BUE resisted movements - pt pushes posteriorly and requires physical assist to correct. After practice and physical prompts, pt is able to wt shift sufficiently forward to decrease reliance on UEs. Pt was able to reach outside of base and reaching across midline. Performed targeted standing heel strikes with BUE support. Ambulated back to room and left up in chair with Chair alarm activated, call light and needs in reach  2nd session: Pt up in chair; agreeable to PT. No c/o pain. Ambulated 175' to gym with RW and CG-SBA. Seated stepper x 8 minutes for reciprocal patterns. Pt reports that he has had episodes in the past few years of \"freezing\" on curbs, stairs or when walking. In each instance, someone has had to assist him to walk.  Performed slant board calf stretches 2 x 60 seconds each LE. Performed tall kneeling with no LOB despite mild challenge in all 4 planes. Performed long seated hamstring/gastroc stretch. Ambulated back to room with SBA and left up in chair with Chair alarm activated, call light and needs in reach    Goals  Short term goals  Time Frame for Short term goals:  To be met prior to discharge  Short term goal 1: Pt will be Independent with all bed mobility  Short term goal 2: Pt will be  Modified independent with AAD for transfers  Short term goal 3: Pt will Ambulate 200' with LRAD and Modified independence  Short term goal 4: Pt will ascend/descend 4 steps with Mod I  Long term goals  Time Frame for Long term goals : STG = LTGs  Patient Goals   Patient goals : \"to get walking better and using the cane again\"    Plan    Plan  Times per week: 90 minutes on 5 days/week  Times per day: Daily  Current Treatment Recommendations: Strengthening, ROM, Balance Training, Functional Mobility Training, Endurance Training, Transfer Training, Gait Training, Stair training, Neuromuscular Re-education, Safety Education & Training, Patient/Caregiver Education & Training, Equipment Evaluation, Education, & procurement, Manual Therapy - Soft Tissue Mobilization  Safety Devices  Type of devices: Call light within reach, Chair alarm in place, Left in chair  Restraints  Initially in place: No     Therapy Time   Individual Concurrent Group Co-treatment   Time In 0915         Time Out 1000         Minutes 45             Second Session Therapy Time:   Individual Concurrent Group Co-treatment   Time In 1245         Time Out 1330         Minutes 45           Timed Code Treatment Minutes:  45, 45    Total Treatment Minutes:  [facility],  C/NDT  Meagan Najera, PT

## 2020-04-15 PROCEDURE — 97530 THERAPEUTIC ACTIVITIES: CPT

## 2020-04-15 PROCEDURE — 97116 GAIT TRAINING THERAPY: CPT

## 2020-04-15 PROCEDURE — 97112 NEUROMUSCULAR REEDUCATION: CPT

## 2020-04-15 PROCEDURE — 97130 THER IVNTJ EA ADDL 15 MIN: CPT

## 2020-04-15 PROCEDURE — 1280000000 HC REHAB R&B

## 2020-04-15 PROCEDURE — 97535 SELF CARE MNGMENT TRAINING: CPT

## 2020-04-15 PROCEDURE — 97110 THERAPEUTIC EXERCISES: CPT

## 2020-04-15 PROCEDURE — 6370000000 HC RX 637 (ALT 250 FOR IP): Performed by: PHYSICAL MEDICINE & REHABILITATION

## 2020-04-15 PROCEDURE — 97129 THER IVNTJ 1ST 15 MIN: CPT

## 2020-04-15 RX ADMIN — CHLORDIAZEPOXIDE HYDROCHLORIDE 5 MG: 5 CAPSULE ORAL at 22:36

## 2020-04-15 RX ADMIN — FOLIC ACID 1 MG: 1 TABLET ORAL at 09:41

## 2020-04-15 RX ADMIN — Medication 100 MG: at 09:41

## 2020-04-15 RX ADMIN — ESCITALOPRAM OXALATE 5 MG: 10 TABLET ORAL at 09:41

## 2020-04-15 RX ADMIN — TRAZODONE HYDROCHLORIDE 150 MG: 50 TABLET ORAL at 22:36

## 2020-04-15 RX ADMIN — ASPIRIN 81 MG: 81 TABLET, COATED ORAL at 09:41

## 2020-04-15 RX ADMIN — THERA TABS 1 TABLET: TAB at 09:41

## 2020-04-15 RX ADMIN — CHLORDIAZEPOXIDE HYDROCHLORIDE 5 MG: 5 CAPSULE ORAL at 09:43

## 2020-04-15 RX ADMIN — LISINOPRIL 20 MG: 20 TABLET ORAL at 09:41

## 2020-04-15 ASSESSMENT — PAIN SCALES - GENERAL: PAINLEVEL_OUTOF10: 0

## 2020-04-15 NOTE — CARE COORDINATION
Called [facility], ph: [phone] fx: [phone] and spoke with Olga Harmon to ensure pt can return. Pt can return she just has to figure out whether pt will have to come to their admission ofc or return to his treatment house. Will need to follow up with her Monday and fax final clinical information. Faxed prelim information today.  Salome Ritchie, MSW, LSW

## 2020-04-15 NOTE — PROGRESS NOTES
use of RW and reaching back for surfaces, pt completed all transfers w/ SBA w/ excepetion of CGA for sit>stand from chair w/o armrests d/t pt requiring 2 attempts to complete stance. Pt completed TE w/ medium yoga ball in order to address activity tolerance, balance, and strength- 8 reps x2 of squat w/ ball tap to floor into stance w/ shoulder flexion and chest press into shoulder flexion, rest break between each  TE, CGA-Henrietta for balance d/t occasional posterior LOB. Pt completed HHE w/ medium resistance theraband seated in chair d/t LE fatigue: shoulder flexion, shoulder extension, elbow flexion, elbow extension, horizontal abduction, and forward punches, 10 reps x2, visual and verbal demo during w/ first rep, during 2nd rep pt unable to follow handout and demonstrate TE back to therapist w/o Mod verbal cues- pt will need reinforcement for independence. Pt ambulated ~ 169 ft back to room w/ RW and CGA, pt completed toileting w/ SBA. Pt left seated in chair in room at end of session, chair alarm on, call light and needs within reach. Cognition  Overall Cognitive Status: Exceptions  Attention Span: Difficulty dividing attention; Attends with cues to redirect  Memory: Decreased short term memory;Decreased recall of precautions  Safety Judgement: Decreased awareness of need for assistance;Decreased awareness of need for safety  Insights: Decreased awareness of deficits              Plan   Plan  Times per week: 5-7x/wk; 90 minutes  Times per day: Daily  Current Treatment Recommendations: Strengthening, Balance Training, Functional Mobility Training, Endurance Training, Neuromuscular Re-education, Safety Education & Training, Patient/Caregiver Education & Training, Self-Care / ADL, Home Management Training, Equipment Evaluation, Education, & procurement    Goals  Short term goals  Time Frame for Short term goals: 7-10 days   Short term goal 1: Pt will complete all functional transfers at MOD I level- not met,

## 2020-04-15 NOTE — PROGRESS NOTES
Kp Meredith  4/15/2020  [id number]    Chief Complaint: Abnormality of gait due to impairment of balance    Subjective:   No overnight events. No current complaints. Balance remains a significant safety risk. ROS: No CP, SOB, dyspnea    Objective:  Patient Vitals for the past 24 hrs:   BP Temp Temp src Pulse Resp SpO2   04/15/20 0936 123/82 97.8 °F (36.6 °C) Oral 80 16 96 %   04/14/20 2003 108/79 97.7 °F (36.5 °C) Oral 67 18 99 %     Gen: No distress, pleasant. Resting in bedside chair  HEENT: Normocephalic, atraumatic  CV: Regular rate and rhythm. No MRG   Resp: No respiratory distress. CTAB   Abd: Soft, nontender nondistended  Ext: No edema. Neuro: Alert, oriented, appropriately interactive. Decreased sensation in BLE    Laboratory data: Available via EMR. Therapy progress:  PT  Position Activity Restriction  Other position/activity restrictions: Kp Meredith is a 62 y.o. male presents to the emergency department today with acute alcohol withdrawal.  The patient reports that he was sent over from [employer] for concern of alcohol withdrawal with history of withdrawal seizure. The patient reports he last drank this morning. States that he generally drinks 4 tall vodka sodas each day. He is tremulous, states that he is nauseated with some diarrhea body aches headache and increased sensorium. To ARU 4/9/20  Objective     Sit to Stand: Contact guard assistance  Stand to sit: Contact guard assistance  Bed to Chair: Minimal assistance  Device: Rolling Walker  Assistance: Contact guard assistance  Distance: 440' x 1, 170' x 1  OT  PT Equipment Recommendations  Equipment Needed: No  Other: To be determined based on progress  Toilet - Technique: Ambulating  Equipment Used: Standard toilet  Toilet Transfers Comments: heavy use of handrail decreased control on descent   Assessment        SLP                Body mass index is 28.58 kg/m².     Assessment:  Patient Active Problem List   Diagnosis    Alcohol withdrawal, uncomplicated ([address]    Essential hypertension    Tremor    Hypophosphatemia    Abnormality of gait due to impairment of balance       Plan:   Alcohol withdrawal: Weaned from Precedex. Continue PRN Librium.  PT/OT    Decreased cognition: poor memory, SLP     HTN: lisinopril 20     Insomnia: trazodone 150     Adjustement disorder: Started lexapro - increase in coming days    Anxiety: would benefit from OP CBT     Bowels: Per protocol  Bladder: Per protocol   Sleep: Trazodone provided prn. Pain: tylenol   DVT PPx: ambulating >500'    Sandor Palacios MD 4/15/2020, 10:00 AM    * This document was created using dictation software. While all precautions were taken to ensure accuracy, errors may have occurred. Please disregard any typographical errors.

## 2020-04-15 NOTE — PROGRESS NOTES
diarrhea body aches headache and increased sensorium. To ARU 4/9/20     Subjective   General  Response To Previous Treatment: Patient with no complaints from previous session. Family / Caregiver Present: No  Subjective  Subjective: Slept poorly. Feels better this a.m.; agreeable to PT  General Comment  Comments: Pt up in chair upon arrival  Pain Screening  Patient Currently in Pain: Denies       Orientation        Objective   Transfers  Sit to Stand: Contact guard assistance  Stand to sit: Contact guard assistance  Stand Pivot Transfers: Contact guard assistance  Lateral Transfers: Contact guard assistance  Ambulation  Ambulation?: Yes  Ambulation 1  Surface: level tile  Device: Rolling Walker  Assistance: Contact guard assistance  Quality of Gait: Improved erna, decreased heel strike bilaterally  Distance: 170' x 2   Stairs/Curb  Stairs?: Yes  Stairs  # Steps : 14  Rails: Bilateral  Assistance: Supervision     Comment: 1st session: Ambulation as above. Seated stepper x 10 minutes at level 1.5 Performed standing slant board stretch 2 x 60 seconds each LE. Performed reciprocal toe taps focusing on erna; pt had difficulty with toe clearance x 4. Ascends/descends full flight as above. Performed forward/backward ambulation on foam initially with BUE support, progressing to single UE support and no support with mild posterior LOB. Performed side stepping on foam followed by balance on wobble board. Second Session  Subjective: Pt seated in chair upon arrival with alarm in place. Pt agreeable to PT session and reporting no pain. Objective: Pt stood with supervision and ambulated 150' with RW and CGA. Pt then performed dynamic standing balance tasks with forward/retro/sidestepping over visual targets and performing modified star drill to challenge dynamic standing balance all without use of AD and requiring min/mod A to maintain balance.  Pt ambulated 5-8' 3-4x without AD and min A for rest breaks during

## 2020-04-15 NOTE — PROGRESS NOTES
Speech Language Pathology  ACUTE REHAB UNIT  SPEECH/LANGUAGE PATHOLOGY      [x] Daily  [] Weekly Care Conference Note  [] Discharge    Patient:Geraldo Hand     :1955  RFE:[id number]  Room #: [place]-4880/8492-98   Rehab Dx/Hx: Debility [R53.81]  Abnormality of gait due to impairment of balance [R26.89]  Date of Admit: 2020      Precautions: falls  Home situation: Independent  ST Dx: Mild cognitive deficits    Daily Treatment Info:   Date: 4/15/2020   Tx session 1   Pain None reported   Subjective     Pt was seen sitting up for cognitive-linguistic tx. Pt was pleasant and cooperative throughout tx. Objective:  Goals    Pt will complete executive function tasks (i.e. planning, deductive reasoning, inferential, functional organization tasks) with >90% accuracy independently. -Deductive reasoning puzzle: 6/8 independently, increasing to 8/8 with max cues   Pt will complete short-term memory tasks, of increasing length and complexity, with >90% accuracy.  -Immediate recall for names paired with faces: 3/5 independently, increasing to 5/5 with max cues; recall after ~5 min delay: 4/5 independently, increasing to 5/5 with min cues  -Recall of boxed information: 14/20; errors were spatial in nature  -Recall for details from a paragraph: 9/15 independently, increasing to 10/15 with min cues, and 11/15 with max cues   Pt will complete divergent and convergent naming tasks with >90% accuracy. Not targeted   Pt will complete additional cognitive assessment with goals to be added per POC as needed. Other areas targeted:    Education:   Provided education to pt re: rationale for tx tasks and POC/goals.     Safety Devices: [x] Call light within reach  [x] Chair alarm activated  [] Bed alarm activated  [] Other:     Assessment:   Speech Therapy Diagnosis  Cognitive Diagnosis: Pt presents with mild cognitive deficits primarily with short term recall and executive function tasks   Aphasia Diagnosis: Pt endorses occasional word finding difficulty in conversation, not observed during evaluation.      Current Diet Order: DIET GENERAL;     Plan:   Frequency:  5days/week   30 minutes/day  Discharge Recommendations:   Barriers: Cognitive deficit  Discharge Recommendations:  [] Home independently  [x] Home with assistance []  24 hour supervision  [] SNF [] Other:  Continued SLP Treatment:  [] Yes [] No [x] TBD based on progress while on ARU [] Vital Stim indicated [] Other:   Estimated discharge date:      Type of Total Treatment Minutes   Session 1     Time In 7888   Time Out 1200   Timed Code Minutes  31   Individual Treatment Minutes  31   Co-Treatment Minutes     Group Treatment Minutes     Concurrent Treatment Minutes       TOTAL DAILY MINUTES:  31    Electronically Signed by     GREGORIO Ch   Speech-Language Pathologist

## 2020-04-16 LAB
ANION GAP SERPL CALCULATED.3IONS-SCNC: 10 MMOL/L (ref 3–16)
BUN BLDV-MCNC: 11 MG/DL (ref 7–20)
CALCIUM SERPL-MCNC: 9.1 MG/DL (ref 8.3–10.6)
CHLORIDE BLD-SCNC: 104 MMOL/L (ref 99–110)
CO2: 25 MMOL/L (ref 21–32)
CREAT SERPL-MCNC: 0.6 MG/DL (ref 0.8–1.3)
GFR AFRICAN AMERICAN: >60
GFR NON-AFRICAN AMERICAN: >60
GLUCOSE BLD-MCNC: 89 MG/DL (ref 70–99)
HCT VFR BLD CALC: 42.9 % (ref 40.5–52.5)
HEMOGLOBIN: 14.7 G/DL (ref 13.5–17.5)
MCH RBC QN AUTO: 35.5 PG (ref 26–34)
MCHC RBC AUTO-ENTMCNC: 34.2 G/DL (ref 31–36)
MCV RBC AUTO: 103.7 FL (ref 80–100)
PDW BLD-RTO: 13.7 % (ref 12.4–15.4)
PLATELET # BLD: 211 K/UL (ref 135–450)
PMV BLD AUTO: 7.6 FL (ref 5–10.5)
POTASSIUM SERPL-SCNC: 4.2 MMOL/L (ref 3.5–5.1)
RBC # BLD: 4.14 M/UL (ref 4.2–5.9)
SODIUM BLD-SCNC: 139 MMOL/L (ref 136–145)
WBC # BLD: 5.6 K/UL (ref 4–11)

## 2020-04-16 PROCEDURE — 97535 SELF CARE MNGMENT TRAINING: CPT

## 2020-04-16 PROCEDURE — 97129 THER IVNTJ 1ST 15 MIN: CPT

## 2020-04-16 PROCEDURE — 85027 COMPLETE CBC AUTOMATED: CPT

## 2020-04-16 PROCEDURE — 97530 THERAPEUTIC ACTIVITIES: CPT

## 2020-04-16 PROCEDURE — 1280000000 HC REHAB R&B

## 2020-04-16 PROCEDURE — 6370000000 HC RX 637 (ALT 250 FOR IP): Performed by: PHYSICAL MEDICINE & REHABILITATION

## 2020-04-16 PROCEDURE — 36415 COLL VENOUS BLD VENIPUNCTURE: CPT

## 2020-04-16 PROCEDURE — 80048 BASIC METABOLIC PNL TOTAL CA: CPT

## 2020-04-16 PROCEDURE — 97130 THER IVNTJ EA ADDL 15 MIN: CPT

## 2020-04-16 PROCEDURE — 97116 GAIT TRAINING THERAPY: CPT

## 2020-04-16 RX ORDER — HYDROXYZINE HYDROCHLORIDE 25 MG/1
25 TABLET, FILM COATED ORAL 3 TIMES DAILY
Status: DISCONTINUED | OUTPATIENT
Start: 2020-04-16 | End: 2020-04-21 | Stop reason: HOSPADM

## 2020-04-16 RX ADMIN — FOLIC ACID 1 MG: 1 TABLET ORAL at 09:31

## 2020-04-16 RX ADMIN — CHLORDIAZEPOXIDE HYDROCHLORIDE 5 MG: 5 CAPSULE ORAL at 09:34

## 2020-04-16 RX ADMIN — TRAZODONE HYDROCHLORIDE 150 MG: 50 TABLET ORAL at 22:35

## 2020-04-16 RX ADMIN — ASPIRIN 81 MG: 81 TABLET, COATED ORAL at 09:31

## 2020-04-16 RX ADMIN — THERA TABS 1 TABLET: TAB at 09:31

## 2020-04-16 RX ADMIN — HYDROXYZINE HYDROCHLORIDE 25 MG: 25 TABLET, FILM COATED ORAL at 22:35

## 2020-04-16 RX ADMIN — LISINOPRIL 20 MG: 20 TABLET ORAL at 09:31

## 2020-04-16 RX ADMIN — Medication 100 MG: at 09:31

## 2020-04-16 RX ADMIN — HYDROXYZINE HYDROCHLORIDE 25 MG: 25 TABLET, FILM COATED ORAL at 13:58

## 2020-04-16 RX ADMIN — ESCITALOPRAM OXALATE 5 MG: 10 TABLET ORAL at 09:30

## 2020-04-16 ASSESSMENT — PAIN SCALES - GENERAL
PAINLEVEL_OUTOF10: 0
PAINLEVEL_OUTOF10: 5

## 2020-04-16 NOTE — PROGRESS NOTES
Physical Therapy  Facility/Department: [facility] ACUTE REHAB UNIT  Daily Treatment Note  NAME: Cruz Shipman  : 1955  MRN: [id number]    Date of Service: 2020    Discharge Recommendations:  Patient would benefit from continued therapy after discharge, S Level 3   PT Equipment Recommendations  Other: RW    Assessment   Body structures, Functions, Activity limitations: Decreased functional mobility ; Decreased ADL status; Decreased ROM; Decreased safe awareness;Decreased cognition;Decreased endurance;Decreased sensation;Decreased balance;Decreased coordination  Assessment: Progressing with endurance and awareness of and self correction for posterior wt shift. Treatment Diagnosis: decreased functional mobility, impaired gait, decreased balance  Prognosis: Good  Barriers to Learning: Cognition/ memory  REQUIRES PT FOLLOW UP: Yes  Activity Tolerance  Activity Tolerance: Patient Tolerated treatment well     Patient Diagnosis(es): Debility   has a past medical history of Alcohol abuse.   has no past surgical history on file. Restrictions  Restrictions/Precautions  Restrictions/Precautions: Fall Risk  Required Braces or Orthoses?: No  Position Activity Restriction  Other position/activity restrictions: Cruz Shipman is a 62 y.o. male presents to the emergency department today with acute alcohol withdrawal.  The patient reports that he was sent over from [employer] for concern of alcohol withdrawal with history of withdrawal seizure. The patient reports he last drank this morning. States that he generally drinks 4 tall vodka sodas each day. He is tremulous, states that he is nauseated with some diarrhea body aches headache and increased sensorium. To ARU 20  Subjective   General  Chart Reviewed: Yes  Response To Previous Treatment: Patient with no complaints from previous session.   Family / Caregiver Present: No  Subjective  Subjective: Pt up in chair; trying to contact bank to find out about stimulus check  General Comment  Comments: pt up in chair upon arrival  Pain Screening  Patient Currently in Pain: Denies       Orientation  Orientation  Overall Orientation Status: Within Functional Limits    Objective   Transfers  Sit to Stand: Contact guard assistance  Stand to sit: Contact guard assistance  Stand Pivot Transfers: Contact guard assistance  Lateral Transfers: Contact guard assistance  Ambulation  Ambulation?: Yes  Ambulation 1  Surface: level tile  Device: Rolling Walker  Assistance: Contact guard assistance  Quality of Gait: Steady erna, decreased heel strike bilaterally  Distance: 440' x 1, 170' x 1     Comment: 1st session: Ambulation as above. Seated stepper x 10 minutes at level 2. Standing gastroc stretch 2 x 60 seconds. Sit to stand on foam with forward cue. Performed walking on dynamic surface (foam) forward/backward with decreasing UE support. Performed standing trunk rotations on foam with occasional assist for posterior wt shift. Progressed to standing forward bend and overhead lift with sensory ball followed by forward rotational bends. Pt requires extra time for balance activities. Returned to room and left up in chair at end of session. 2nd session:  Pt sitting in bedside chair on arrival.  Requested to not participate in PT session at this time due to friend just passing away and needing to make multiple phone calls. Pt states he just received the call about his friend and needs to process things and can not concentrate on anything else at this time. Goals  Short term goals  Time Frame for Short term goals:  To be met prior to discharge  Short term goal 1: Pt will be Independent with all bed mobility  Short term goal 2: Pt will be  Modified independent with AAD for transfers  Short term goal 3: Pt will Ambulate 200' with LRAD and Modified independence  Short term goal 4: Pt will ascend/descend 4 steps with Mod I  Long term goals  Time Frame for Long term goals : STG =

## 2020-04-16 NOTE — PROGRESS NOTES
[] Home independently  [x] Home with assistance []  24 hour supervision  [] SNF [] Other:  Continued SLP Treatment:  [] Yes [] No [x] TBD based on progress while on ARU [] Vital Stim indicated [] Other:   Estimated discharge date:      Type of Total Treatment Minutes   Session 1     Time In 1100   Time Out 1130   Timed Code Minutes  30   Individual Treatment Minutes  30   Co-Treatment Minutes     Group Treatment Minutes     Concurrent Treatment Minutes       TOTAL DAILY MINUTES:  31    Electronically Signed by     Jimmie Delgado MA [facility]  Speech Language Pathologist

## 2020-04-16 NOTE — PROGRESS NOTES
uncomplicated ([facility] [address]    Essential hypertension    Tremor    Hypophosphatemia    Abnormality of gait due to impairment of balance       Plan:   Alcohol withdrawal: Weaned from Precedex. Librium - d/c.  PT/OT    Decreased cognition: poor memory, SLP     HTN: lisinopril 20     Insomnia: trazodone 150     Adjustement disorder: Started lexapro - increase in coming days    Anxiety: would benefit from OP CBT, - start Atarax. Reporting symptoms of agoraphobia     Bowels: Per protocol  Bladder: Per protocol   Sleep: Trazodone provided prn. Pain: tylenol   DVT PPx: ambulating >500'    Sandor Palacios MD 4/16/2020, 10:26 AM    * This document was created using dictation software. While all precautions were taken to ensure accuracy, errors may have occurred. Please disregard any typographical errors.

## 2020-04-17 PROCEDURE — 97110 THERAPEUTIC EXERCISES: CPT

## 2020-04-17 PROCEDURE — 97116 GAIT TRAINING THERAPY: CPT

## 2020-04-17 PROCEDURE — 6370000000 HC RX 637 (ALT 250 FOR IP): Performed by: PHYSICAL MEDICINE & REHABILITATION

## 2020-04-17 PROCEDURE — 1280000000 HC REHAB R&B

## 2020-04-17 PROCEDURE — 97530 THERAPEUTIC ACTIVITIES: CPT

## 2020-04-17 PROCEDURE — 97535 SELF CARE MNGMENT TRAINING: CPT

## 2020-04-17 PROCEDURE — 97129 THER IVNTJ 1ST 15 MIN: CPT

## 2020-04-17 PROCEDURE — 97130 THER IVNTJ EA ADDL 15 MIN: CPT

## 2020-04-17 PROCEDURE — 97112 NEUROMUSCULAR REEDUCATION: CPT

## 2020-04-17 RX ORDER — POLYETHYLENE GLYCOL 3350 17 G/17G
17 POWDER, FOR SOLUTION ORAL DAILY
Status: DISCONTINUED | OUTPATIENT
Start: 2020-04-18 | End: 2020-04-21 | Stop reason: HOSPADM

## 2020-04-17 RX ADMIN — LISINOPRIL 20 MG: 20 TABLET ORAL at 08:24

## 2020-04-17 RX ADMIN — ESCITALOPRAM OXALATE 5 MG: 10 TABLET ORAL at 08:24

## 2020-04-17 RX ADMIN — HYDROXYZINE HYDROCHLORIDE 25 MG: 25 TABLET, FILM COATED ORAL at 08:25

## 2020-04-17 RX ADMIN — ASPIRIN 81 MG: 81 TABLET, COATED ORAL at 08:24

## 2020-04-17 RX ADMIN — Medication 100 MG: at 08:24

## 2020-04-17 RX ADMIN — HYDROXYZINE HYDROCHLORIDE 25 MG: 25 TABLET, FILM COATED ORAL at 13:43

## 2020-04-17 RX ADMIN — FOLIC ACID 1 MG: 1 TABLET ORAL at 08:25

## 2020-04-17 RX ADMIN — HYDROXYZINE HYDROCHLORIDE 25 MG: 25 TABLET, FILM COATED ORAL at 22:16

## 2020-04-17 RX ADMIN — THERA TABS 1 TABLET: TAB at 08:24

## 2020-04-17 RX ADMIN — TRAZODONE HYDROCHLORIDE 150 MG: 50 TABLET ORAL at 22:16

## 2020-04-17 ASSESSMENT — PAIN SCALES - GENERAL: PAINLEVEL_OUTOF10: 0

## 2020-04-17 NOTE — PROGRESS NOTES
file.    Restrictions  Restrictions/Precautions  Restrictions/Precautions: Fall Risk  Required Braces or Orthoses?: No  Position Activity Restriction  Other position/activity restrictions: Bartolo Hackett is a 62 y.o. male presents to the emergency department today with acute alcohol withdrawal.  The patient reports that he was sent over from [employer] for concern of alcohol withdrawal with history of withdrawal seizure. The patient reports he last drank this morning. States that he generally drinks 4 tall vodka sodas each day. He is tremulous, states that he is nauseated with some diarrhea body aches headache and increased sensorium. To ARU 4/9/20  Subjective   General  Chart Reviewed: Yes  Patient assessed for rehabilitation services?: Yes  Additional Pertinent Hx: PMH: Alcohol abuse  Family / Caregiver Present: No  Referring Practitioner: Dr. Julianna Gitelman Staggs  Diagnosis: alchohol withdrawal and abnormality of gait due to impairment of balance   Subjective  Subjective: Pt seated in chair on arrival, agreeable to OT session. Pt requesting grooming and washing hair, instead of a shower. Pre Treatment Pain Screening  Intervention List: Patient able to continue with treatment  Vital Signs  Patient Currently in Pain: Denies   Orientation  Orientation  Overall Orientation Status: Within Normal Limits  Objective    ADL  Grooming: Stand by assistance;Setup(washing face, shaving in stance at sink)  UE Dressing: Set-up don/doff t-shirt in stance  LE Dressing: Independent(donned stockinette to B LEs)  Additional Comments: Pt declined further bathing/dressing activities. \"I just had a shower yesterday. \" OT assisted with rinsing pt's hair. Pt independent with applying shampoo and washing.         Balance  Standing Balance: Stand by assistance(w/RW)  Standing Balance  Time: 16min  Activity: functional mobility to/from bathroom sink, grooming, washing hair in stance at sink  Functional Mobility  Functional - Mobility Device: time for these activities, 6 min. To address balance and standing tolerance, pt performed beanbag toss activity in stance to throw 17 beanbags in basket and retrieve ones missed from the floor with a reacher. First trial, pt successful w/6/17 beanbags. Second tral, pt successful with 16/17. Seated, pt performed further UE exercises w/5# wts B UEs: triceps X 15 reps, abd X 10 reps, shoulder press X 10 reps, forward pushes X 10 reps. Pt played wii bowling  to further address balance and standing tolerance, and ambulated back to his room, with 17 min total standing time and SBA transfer to chair w/RW. Pt left in chair w/alarm in place, needs in reach. Goals  Short term goals  Time Frame for Short term goals: 7-10 days   Short term goal 1: Pt will complete all functional transfers at MOD I level- not met, progressing  Short term goal 2: Pt will complete UB/LB dressing at MOD I level- not met, progressing  Short term goal 3: Pt will complete UB/LB bathing at MOD I level- not met, progressing  Short term goal 4: Pt will complete light IADL task with supervision- not met, progressing  Short term goal 5: Pt will tolerate at least 10 minutes in stance with supervision assist while completing functional task- not met, progressing  Long term goals  Time Frame for Long term goals : LTG=STG  Patient Goals   Patient goals : increase balance and functional mobility.  Gain independence back        First Session Therapy Time   Individual Concurrent Group Co-treatment   Time In 0945         Time Out 1030         Minutes 45               Second Session Therapy Time:   Individual Concurrent Group Co-treatment   Time In 1245         Time Out 1330         Minutes 45           Timed Code Treatment Minutes:  45 min am session, 45 min pm session (Total 90 min)    Total Treatment Minutes:  Treatment Minutes:  45 min am session, 45 min pm session (Total 90 min)      [address], [id number], OTR/L, [id number]

## 2020-04-17 NOTE — PROGRESS NOTES
Speech Language Pathology  ACUTE REHAB UNIT  SPEECH/LANGUAGE PATHOLOGY      [x] Daily  [] Weekly Care Conference Note  [] Discharge    Patient:Geraldo Hand     PIM:[birth year]  DNL:[id number]  Room #: VUF-8926/9538-07   Rehab Dx/Hx: Debility [R53.81]  Abnormality of gait due to impairment of balance [R26.89]  Date of Admit: 4/9/2020      Precautions: falls  Home situation: Independent  ST Dx: Mild cognitive deficits    Daily Treatment Info:   Date: 4/17/2020   Tx session 1   Pain Pt denied. Subjective     Pt highly motivated and engaged during session. Stated speech therapy is harder for him than the 'other' therapies. 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 Pt became slightly tearful at end of session when talking about his friend who has passed previous date. Emotional support provided. Objective:  Goals    Pt will complete executive function tasks (i.e. planning, deductive reasoning, inferential, functional organization tasks) with >90% accuracy independently. Pt stated he had a lot of difficulty with deductive reasoning puzzles previous date. Had pt complete the 2 same puzzles again and reviewed. Improved ability to utilize process of elimination skills continued to require intermittent cues for use of deduction/ inferencing     - Deductive Reasoning Puzzles- pt completed with 67% acc Independently requiring min-mod cues to improve to 100%, then completed second puzzle with 80% acc within 6 minutes     Pt will complete short-term memory tasks, of increasing length and complexity, with >90% accuracy. - Able to recall fx information provided by MD re: changing anxiety medications   - Able to recall 'homework' given by SLP previous date    Pt will complete divergent and convergent naming tasks with >90% accuracy.    Double Meaning Deduction (convergent reasoning)   - 8/8 required min cues to provide overall theme being described  - 5/8 (63%) all items made sense but required min cues to provide overall theme being described and change words to make sense     Pt will complete additional cognitive assessment with goals to be added per POC as needed. Goal not targeted this session. Other areas targeted:    Education:   Provided education to pt re: rationale for tx tasks and POC/goals. Provided pt with cognitive activities to complete over the weekend. Pt v/u and in agreement. Safety Devices: [x] Call light within reach  [x] Chair alarm activated  [] Bed alarm activated  [] Other:     Assessment:   Speech Therapy Diagnosis  Cognitive Diagnosis: Pt presents with mild cognitive deficits primarily with short term recall and executive function tasks   Aphasia Diagnosis: Pt endorses occasional word finding difficulty in conversation, not observed during evaluation. Current Diet Order: DIET GENERAL;     Plan:   Frequency:  5days/week   30 minutes/day  Discharge Recommendations:   Barriers: Cognitive deficit  Discharge Recommendations:  [] Home independently  [x] Home with assistance []  24 hour supervision  [] SNF [] Other:  Continued SLP Treatment:  [] Yes [] No [x] TBD based on progress while on ARU [] Vital Stim indicated [] Other:   Estimated discharge date:  4/21/20     Type of Total Treatment Minutes   Session 1     Time In 1330   Time Out 1410   Timed Code Minutes  40   Individual Treatment Minutes  40    Co-Treatment Minutes     Group Treatment Minutes     Concurrent Treatment Minutes       TOTAL DAILY MINUTES:  40    Electronically Signed by     Yessica RUIZ CCC-SLP #[id number] 4/17/2020 7:47 AM  Speech-Language Pathologist

## 2020-04-17 NOTE — PROGRESS NOTES
Jase Villa  4/17/2020  [id number]    Chief Complaint: Abnormality of gait due to impairment of balance    Subjective:   No overnight events. No current complaints. Reports learning one of his good friends lost the brunson to cancer yesterday. ROS: No CP, SOB, dyspnea    Objective:  Patient Vitals for the past 24 hrs:   BP Temp Temp src Pulse Resp SpO2   04/17/20 0745 110/73 97.7 °F (36.5 °C) -- 69 -- --   04/16/20 2030 97/64 97.7 °F (36.5 °C) Oral 71 16 98 %     Gen: No distress, pleasant. Resting in bedside chair  HEENT: Normocephalic, atraumatic  CV: Regular rate and rhythm. No MRG   Resp: No respiratory distress. CTAB   Abd: Soft, nontender nondistended  Ext: No edema. Neuro: Alert, oriented, appropriately interactive. Decreased sensation in BLE    Laboratory data: Available via EMR. Therapy progress:  PT  Position Activity Restriction  Other position/activity restrictions: Jase Villa is a 62 y.o. male presents to the emergency department today with acute alcohol withdrawal.  The patient reports [facility] withdrawal with history of withdrawal seizure. The patient reports he last drank this morning. States that he generally drinks 4 tall vodka sodas each day. He is tremulous, states that he is nauseated with some diarrhea body aches headache and increased sensorium. To ARU 4/9/20  Objective     Sit to Stand: Stand by assistance(x3)  Stand to sit: Stand by assistance  Bed to Chair: Minimal assistance  Device: Rolling Walker  Assistance: Stand by assistance  Distance: 104'  OT  PT Equipment Recommendations  Equipment Needed: No  Other: RW  Toilet - Technique: Ambulating  Equipment Used: Standard toilet  Toilet Transfers Comments: heavy use of handrail decreased control on descent   Assessment        SLP                Body mass index is 28.58 kg/m².     Assessment:  Patient Active Problem List   Diagnosis    Alcohol withdrawal, uncomplicated ([facility] [address]  Essential hypertension    Tremor    Hypophosphatemia    Abnormality of gait due to impairment of balance       Plan:   Alcohol withdrawal: Weaned from Precedex. Librium d/c'd.  PT/OT    Decreased cognition: poor memory, SLP     HTN: lisinopril 20     Insomnia: trazodone 150     Adjustement disorder: Started lexapro - increase in coming days    Anxiety: would benefit from OP CBT, Started Atarax. Reporting symptoms of agoraphobia     Bowels: Per protocol  Bladder: Per protocol   Sleep: Trazodone provided prn. Pain: tylenol   DVT PPx: ambulating >500'    Sandor Palacios MD 4/17/2020, 9:30 AM    * This document was created using dictation software. While all precautions were taken to ensure accuracy, errors may have occurred. Please disregard any typographical errors.

## 2020-04-17 NOTE — PROGRESS NOTES
Physical Therapy  Facility/Department: [facility] ACUTE REHAB UNIT  Daily Treatment Note  NAME: Ning Dos Santos  : 1955  MRN: [id number]    Date of Service: 2020    Discharge Recommendations:  Patient would benefit from continued therapy after discharge, S Level 3   PT Equipment Recommendations  Equipment Needed: Yes  Mobility Devices: Celestia Blu: Rolling    Assessment   Body structures, Functions, Activity limitations: Decreased functional mobility ; Decreased ADL status; Decreased ROM; Decreased safe awareness;Decreased cognition;Decreased endurance;Decreased sensation;Decreased balance;Decreased coordination  Assessment: Pt with improved dynamic standing balance with ambulation this date while trialing canes. Pt would benefit from continued skilled PT services to address dynamic balance deficits. Treatment Diagnosis: decreased functional mobility, impaired gait, decreased balance  Prognosis: Good  Decision Making: Low Complexity  Clinical Presentation: evolving  PT Education: Goals;PT Role;Plan of Care;Transfer Training;General Safety;Gait Training;Equipment; Functional Mobility Training  Patient Education: Pt verbalized understanding but would benefit from reinforcement. : reinforced purpose of alarms due to safety deficits; pt verbalizes understanding but need reiforcement due to memory and decreased insight. : use of RW for safe functional mobility and use of canes to challenge balance during sessions  Barriers to Learning: Cognition/ memory  REQUIRES PT FOLLOW UP: Yes  Activity Tolerance  Activity Tolerance: Patient Tolerated treatment well     Patient Diagnosis(es): There were no encounter diagnoses. has a past medical history of Alcohol abuse.   has no past surgical history on file.     Restrictions  Restrictions/Precautions  Restrictions/Precautions: Fall Risk  Required Braces or Orthoses?: No  Position Activity Restriction  Other position/activity restrictions: Ning Dos Santos is a 62 above  Distance: 90'  Comments: Pt intermittently hitting 2-3/4 points of contact on QC. No LOB. Stairs/Curb  Stairs?: Yes  Stairs  # Steps : 14  Rails: Bilateral  Assistance: Supervision  Comment: Negotiated with reciprocal gait pattern, no LOB. Neuromuscular Education  Neuromuscular Comments: Pt performed lunging and reaching activities in // bars with CGA/Min A for balance and performed standing chop/lift for increased trunk rotation and to challenged standing balance with CGA for balance and mod TC at trunk for increased rotation ~15 minutes total for tasks. Balance  Posture: Good  Sitting - Static: Good  Sitting - Dynamic: Good  Standing - Static: Fair  Standing - Dynamic: Fair;-      Comment: 1st session: At beginning of session pt ambulating to bathroom with RW and SBA and stood at sink ~5 minutes for grooming with SBA for balance. Pt then performed functional mobility and balance tasks as documented above. Extended time spent discussing benefits of RW for ambulation while trialing cane to challenge dynamic balance with pt verbalizing understanding. Pt returned to room and left seated in chair with all needs in reach and alarm engaged. 2nd session: Pt sitting up in chair upon arrival. Pt agreeable to PT treatment. Pt coming to stand with SBA at RW and ambulating 168 ft with SBA. Pt brought into therapy gym and completing x10 minutes on seated stepper level 3. Pt then ambulating to hip chair for rest break. Pt then brought to ballet bars and completing lunges on bubble side of BOSU ball x10 BLE with RUE providing support on ballet bar. Pt then completing stagger stance without UE support and opposite hand shaking red therastick, x45 seconds each bout. Increased difficulty maintaining balance with LLE positioned forward requiring occasional min A to maintain balance. Pt then brought to gym chair for seated rest break. Pt then coming to stand with B feet positioned on Aeromat.  Initial stand, pt requiring min A to maintain upright secondary to posterior lean. With increased verbal and tactile cueing, pt able to achieve upright posture with CGA only. Pt maintaining ~30 seconds prior to returning to posterior lean, correcting with CGA. Pt then returned to seated position. When educated on posterior lean, pt reporting \"And when I fall, it's always backwards. Never forward or to the side. I always go backwards. \" Pt then coming to stand a second time on Aeromat with CGA only and with improved posture and balance, maintaining static stance x60 seconds with occasional CGA only to correct posterior lean. Pt then ambulating 168 ft with RW and SBA. Pt seated in chair and left with all needs in reach. Goals  Short term goals  Time Frame for Short term goals:  To be met prior to discharge  Short term goal 1: Pt will be Independent with all bed mobility  Short term goal 2: Pt will be  Modified independent with AAD for transfers  Short term goal 3: Pt will Ambulate 200' with LRAD and Modified independence  Short term goal 4: Pt will ascend/descend 4 steps with Mod I  Long term goals  Time Frame for Long term goals : STG = LTGs  Patient Goals   Patient goals : \"to get walking better and using the cane again\"    Plan    Plan  Times per week: 90 minutes on 5 days/week  Times per day: Daily  Current Treatment Recommendations: Strengthening, ROM, Balance Training, Functional Mobility Training, Endurance Training, Transfer Training, Gait Training, Stair training, Neuromuscular Re-education, Safety Education & Training, Patient/Caregiver Education & Training, Equipment Evaluation, Education, & procurement, Manual Therapy - Soft Tissue Mobilization  Safety Devices  Type of devices: Call light within reach, Chair alarm in place, Left in chair, Gait belt  Restraints  Initially in place: No     Therapy Time   Individual Concurrent Group Co-treatment   Time In 0830         Time Out 0915         Minutes 45              Timed Code Treatment Minutes:  45 Minutes    Total Treatment Minutes:  [address] [place], [place] [id number]     Second Session Therapy Time:   Individual Concurrent Group Co-treatment   Time In 0830         Time Out 0915         Minutes 45           Timed Code Treatment Minutes:  90    Total Treatment Minutes:  90  Second Session only:  Eboni Randhawa, PT, DPT, [id number]

## 2020-04-18 PROCEDURE — 94760 N-INVAS EAR/PLS OXIMETRY 1: CPT

## 2020-04-18 PROCEDURE — 1280000000 HC REHAB R&B

## 2020-04-18 PROCEDURE — 6370000000 HC RX 637 (ALT 250 FOR IP): Performed by: PHYSICAL MEDICINE & REHABILITATION

## 2020-04-18 RX ADMIN — HYDROXYZINE HYDROCHLORIDE 25 MG: 25 TABLET, FILM COATED ORAL at 09:05

## 2020-04-18 RX ADMIN — ASPIRIN 81 MG: 81 TABLET, COATED ORAL at 09:05

## 2020-04-18 RX ADMIN — THERA TABS 1 TABLET: TAB at 09:05

## 2020-04-18 RX ADMIN — FOLIC ACID 1 MG: 1 TABLET ORAL at 09:05

## 2020-04-18 RX ADMIN — HYDROXYZINE HYDROCHLORIDE 25 MG: 25 TABLET, FILM COATED ORAL at 14:54

## 2020-04-18 RX ADMIN — LISINOPRIL 20 MG: 20 TABLET ORAL at 09:05

## 2020-04-18 RX ADMIN — Medication 100 MG: at 09:05

## 2020-04-18 RX ADMIN — ESCITALOPRAM OXALATE 5 MG: 10 TABLET ORAL at 09:05

## 2020-04-18 RX ADMIN — POLYETHYLENE GLYCOL 3350 17 G: 17 POWDER, FOR SOLUTION ORAL at 09:05

## 2020-04-18 RX ADMIN — TRAZODONE HYDROCHLORIDE 150 MG: 50 TABLET ORAL at 22:07

## 2020-04-18 RX ADMIN — HYDROXYZINE HYDROCHLORIDE 25 MG: 25 TABLET, FILM COATED ORAL at 22:07

## 2020-04-18 ASSESSMENT — PAIN SCALES - GENERAL: PAINLEVEL_OUTOF10: 0

## 2020-04-19 PROCEDURE — 6370000000 HC RX 637 (ALT 250 FOR IP): Performed by: PHYSICAL MEDICINE & REHABILITATION

## 2020-04-19 PROCEDURE — 1280000000 HC REHAB R&B

## 2020-04-19 PROCEDURE — 94760 N-INVAS EAR/PLS OXIMETRY 1: CPT

## 2020-04-19 RX ADMIN — HYDROXYZINE HYDROCHLORIDE 25 MG: 25 TABLET, FILM COATED ORAL at 08:56

## 2020-04-19 RX ADMIN — ESCITALOPRAM OXALATE 5 MG: 10 TABLET ORAL at 08:56

## 2020-04-19 RX ADMIN — ASPIRIN 81 MG: 81 TABLET, COATED ORAL at 08:56

## 2020-04-19 RX ADMIN — HYDROXYZINE HYDROCHLORIDE 25 MG: 25 TABLET, FILM COATED ORAL at 22:12

## 2020-04-19 RX ADMIN — LISINOPRIL 20 MG: 20 TABLET ORAL at 08:56

## 2020-04-19 RX ADMIN — Medication 100 MG: at 08:56

## 2020-04-19 RX ADMIN — THERA TABS 1 TABLET: TAB at 08:56

## 2020-04-19 RX ADMIN — TRAZODONE HYDROCHLORIDE 150 MG: 50 TABLET ORAL at 22:12

## 2020-04-19 RX ADMIN — FOLIC ACID 1 MG: 1 TABLET ORAL at 08:56

## 2020-04-19 RX ADMIN — POLYETHYLENE GLYCOL 3350 17 G: 17 POWDER, FOR SOLUTION ORAL at 08:56

## 2020-04-19 ASSESSMENT — PAIN SCALES - GENERAL: PAINLEVEL_OUTOF10: 0

## 2020-04-20 LAB
ANION GAP SERPL CALCULATED.3IONS-SCNC: 11 MMOL/L (ref 3–16)
BUN BLDV-MCNC: 10 MG/DL (ref 7–20)
CALCIUM SERPL-MCNC: 9.5 MG/DL (ref 8.3–10.6)
CHLORIDE BLD-SCNC: 103 MMOL/L (ref 99–110)
CO2: 26 MMOL/L (ref 21–32)
CREAT SERPL-MCNC: 0.7 MG/DL (ref 0.8–1.3)
GFR AFRICAN AMERICAN: >60
GFR NON-AFRICAN AMERICAN: >60
GLUCOSE BLD-MCNC: 99 MG/DL (ref 70–99)
HCT VFR BLD CALC: 43.1 % (ref 40.5–52.5)
HEMOGLOBIN: 15 G/DL (ref 13.5–17.5)
MCH RBC QN AUTO: 36.2 PG (ref 26–34)
MCHC RBC AUTO-ENTMCNC: 34.9 G/DL (ref 31–36)
MCV RBC AUTO: 103.8 FL (ref 80–100)
PDW BLD-RTO: 13.2 % (ref 12.4–15.4)
PLATELET # BLD: 224 K/UL (ref 135–450)
PMV BLD AUTO: 8.2 FL (ref 5–10.5)
POTASSIUM SERPL-SCNC: 4.3 MMOL/L (ref 3.5–5.1)
RBC # BLD: 4.15 M/UL (ref 4.2–5.9)
SODIUM BLD-SCNC: 140 MMOL/L (ref 136–145)
WBC # BLD: 5.6 K/UL (ref 4–11)

## 2020-04-20 PROCEDURE — 97110 THERAPEUTIC EXERCISES: CPT

## 2020-04-20 PROCEDURE — 97530 THERAPEUTIC ACTIVITIES: CPT

## 2020-04-20 PROCEDURE — 97130 THER IVNTJ EA ADDL 15 MIN: CPT

## 2020-04-20 PROCEDURE — 6370000000 HC RX 637 (ALT 250 FOR IP): Performed by: PHYSICAL MEDICINE & REHABILITATION

## 2020-04-20 PROCEDURE — 80048 BASIC METABOLIC PNL TOTAL CA: CPT

## 2020-04-20 PROCEDURE — 97116 GAIT TRAINING THERAPY: CPT

## 2020-04-20 PROCEDURE — 97535 SELF CARE MNGMENT TRAINING: CPT

## 2020-04-20 PROCEDURE — 36415 COLL VENOUS BLD VENIPUNCTURE: CPT

## 2020-04-20 PROCEDURE — 85027 COMPLETE CBC AUTOMATED: CPT

## 2020-04-20 PROCEDURE — 1280000000 HC REHAB R&B

## 2020-04-20 PROCEDURE — 97129 THER IVNTJ 1ST 15 MIN: CPT

## 2020-04-20 PROCEDURE — 97112 NEUROMUSCULAR REEDUCATION: CPT

## 2020-04-20 RX ORDER — FOLIC ACID 1 MG/1
1 TABLET ORAL DAILY
Qty: 30 TABLET | Refills: 1 | Status: SHIPPED | OUTPATIENT
Start: 2020-04-21

## 2020-04-20 RX ORDER — HYDROXYZINE HYDROCHLORIDE 25 MG/1
25 TABLET, FILM COATED ORAL 3 TIMES DAILY
Qty: 90 TABLET | Refills: 3 | Status: SHIPPED | OUTPATIENT
Start: 2020-04-20

## 2020-04-20 RX ORDER — LANOLIN ALCOHOL/MO/W.PET/CERES
100 CREAM (GRAM) TOPICAL DAILY
Qty: 30 TABLET | Refills: 3 | Status: SHIPPED | OUTPATIENT
Start: 2020-04-21

## 2020-04-20 RX ORDER — ESCITALOPRAM OXALATE 10 MG/1
10 TABLET ORAL DAILY
Status: DISCONTINUED | OUTPATIENT
Start: 2020-04-21 | End: 2020-04-21 | Stop reason: HOSPADM

## 2020-04-20 RX ORDER — ESCITALOPRAM OXALATE 10 MG/1
10 TABLET ORAL DAILY
Qty: 30 TABLET | Refills: 3 | Status: SHIPPED | OUTPATIENT
Start: 2020-04-21

## 2020-04-20 RX ADMIN — HYDROXYZINE HYDROCHLORIDE 25 MG: 25 TABLET, FILM COATED ORAL at 09:01

## 2020-04-20 RX ADMIN — HYDROXYZINE HYDROCHLORIDE 25 MG: 25 TABLET, FILM COATED ORAL at 14:32

## 2020-04-20 RX ADMIN — ASPIRIN 81 MG: 81 TABLET, COATED ORAL at 09:01

## 2020-04-20 RX ADMIN — LISINOPRIL 20 MG: 20 TABLET ORAL at 09:01

## 2020-04-20 RX ADMIN — THERA TABS 1 TABLET: TAB at 09:01

## 2020-04-20 RX ADMIN — FOLIC ACID 1 MG: 1 TABLET ORAL at 09:01

## 2020-04-20 RX ADMIN — POLYETHYLENE GLYCOL 3350 17 G: 17 POWDER, FOR SOLUTION ORAL at 09:01

## 2020-04-20 RX ADMIN — ESCITALOPRAM OXALATE 5 MG: 10 TABLET ORAL at 09:01

## 2020-04-20 RX ADMIN — HYDROXYZINE HYDROCHLORIDE 25 MG: 25 TABLET, FILM COATED ORAL at 22:22

## 2020-04-20 RX ADMIN — Medication 100 MG: at 09:01

## 2020-04-20 RX ADMIN — TRAZODONE HYDROCHLORIDE 150 MG: 50 TABLET ORAL at 22:22

## 2020-04-20 ASSESSMENT — PAIN SCALES - GENERAL: PAINLEVEL_OUTOF10: 0

## 2020-04-20 NOTE — PLAN OF CARE
Problem: Falls - Risk of:  Goal: Will remain free from falls  Description: Will remain free from falls  4/11/2020 1555 by Naomi Gil RN  Patient was very unsteady this morning while walking to bathroom and using toilet. This afternoon patient was less unsteady due to taller walker,  but still needed gait belt and contact assist.  Outcome: Ongoing                                               Problem: Pain:  Goal: Control of acute pain  Description: Control of acute pain  Outcome: Ongoing     Patient has had some abdominal pain. Took miralax around 1500, stating he has not had bm for a day or so.
Problem: Falls - Risk of:  Goal: Will remain free from falls  Description: Will remain free from falls  4/12/2020 1054 by Ned Barber RN  Outcome: Ongoing  4/12/2020 0256 by Alfredo Verdugo RN  Outcome: Ongoing  Goal: Absence of physical injury  Description: Absence of physical injury  4/12/2020 1054 by Ned Barber RN  Outcome: Ongoing  4/12/2020 0256 by Alfredo Verdugo RN  Outcome: Ongoing     Problem: Musculor/Skeletal Functional Status  Goal: Highest potential functional level  Outcome: Ongoing  Goal: Absence of falls  4/12/2020 1054 by Ned Barber RN  Outcome: Ongoing  4/12/2020 0256 by Alfredo Verdugo RN  Outcome: Ongoing     Problem: Pain:  Goal: Pain level will decrease  Description: Pain level will decrease  Outcome: Ongoing  Goal: Control of acute pain  Description: Control of acute pain  Outcome: Ongoing  Goal: Control of chronic pain  Description: Control of chronic pain  Outcome: Ongoing
Problem: Falls - Risk of:  Goal: Will remain free from falls  Description: Will remain free from falls  4/18/2020 1035 by Dina Perez RN  Outcome: Ongoing  Note: Pt remains free from falls. Safety precautions in place. Bed in lowest position, bed/chair wheels locked, call light with in reach, bed/chair alarm on, fall risk wrist band on, SAFE outside of doorway. Will continue to monitor.
Problem: Falls - Risk of:  Goal: Will remain free from falls  Description: Will remain free from falls  Outcome: Ongoing     Problem: Falls - Risk of:  Goal: Absence of physical injury  Description: Absence of physical injury  Outcome: Ongoing     Problem: Musculor/Skeletal Functional Status  Goal: Highest potential functional level  Outcome: Ongoing     Problem: Pain:  Goal: Pain level will decrease  Description: Pain level will decrease  Outcome: Ongoing     Problem: Pain:  Goal: Control of acute pain  Description: Control of acute pain  Outcome: Ongoing     Problem: Pain:  Goal: Control of chronic pain  Description: Control of chronic pain  Outcome: Ongoing
Problem: Falls - Risk of:  Goal: Will remain free from falls  Description: Will remain free from falls  Outcome: Ongoing     Problem: Musculor/Skeletal Functional Status  Goal: Highest potential functional level  Outcome: Ongoing  Goal: Absence of falls  Outcome: Ongoing     Problem: Pain:  Description: Pain management should include both nonpharmacologic and pharmacologic interventions.   Goal: Pain level will decrease  Description: Pain level will decrease  Outcome: Completed  Goal: Control of acute pain  Description: Control of acute pain  Outcome: Completed  Goal: Control of chronic pain  Description: Control of chronic pain  Outcome: Completed
Home  [x] Home Independently   [] Home with supervision    []SNF     [] Other                                           Physician anticipated functional outcomes:   Will regain function to an independent level for activities   IPOC brief synthesis: 81-year-old male with a history of alcohol abuse and hypertension who was admitted to alcohol rehabilitation and upon evaluation was noted to be in withdrawal. Morales Robledo was transferred for treatment of acute alcohol withdrawal. Morales Robledo has progressed and weaned from medications appropriately however he has demonstrated significant weakness and imbalances that were deemed unsafe by therapy. Morales Robledo was suggested to continue in an inpatient setting prior to returning home. Morales Robledo reports his goal as improving his strength and balance and returning to alcohol rehabilitation to complete his treatment program.  He was admitted with the above goal.      I have reviewed this initial plan of care and agree with its contents:    Title   Name    Date    Time    Physician: Electronically signed by Aydin Gardner MD on 4/10/2020 at 2:22 PM      Case Mgmt:  Kortney Robins MS, [place], 4/10/2020, 5442    OT:  Jermaine Quiroz OTR/L [id number], 4/10/2020, 12:37    PT: Marci Diaz PT [id number],  C/NDT, 4/10/20, 12:31    RN: Michael Rosen RN 4/9/20 2440    : Faheem Rodas, [address] [place] [address], 4/10/2020 1413    Other:

## 2020-04-20 NOTE — CARE COORDINATION
Chuck's fax line continues to fail, called Estrellita Gosselin and left a voicemail to obtain different number to send info. Await call back.  Erica Diaz, MSW, LSW

## 2020-04-20 NOTE — PROGRESS NOTES
Speech Language Pathology  ACUTE REHAB UNIT  SPEECH/LANGUAGE PATHOLOGY      [x] Daily  [] Weekly Care Conference Note  [x] Discharge    Patient:Geraldo Arthur     WNM:[birth year]  NRR:[id number]  Room #: BPS-7957/2199-24   Rehab Dx/Hx: Debility [R53.81]  Abnormality of gait due to impairment of balance [R26.89]  Date of Admit: 4/9/2020      Precautions: falls  Home situation: Independent  ST Dx: Mild cognitive deficits    Daily Treatment Info:   Date: 4/20/2020   Tx session 1   Pain Denied    Subjective     Pt up in chair. Requesting help with navigating through cell phone to join online conferencing for video Jermaine Velasquez. Plan for d/c to inpt alcohol rehab tomorrow. Pt has progressed towards goals. Mild short term memory deficits persist, appear related to withdrawal therefore suggest follow up with alcohol and psychologic treatment. If deficits persist, recommend OP speech therapy. Objective:  Goals    Pt will complete executive function tasks (i.e. planning, deductive reasoning, inferential, functional organization tasks) with >90% accuracy independently. Targeted via cell phone navigation   - pt required mod cues for planning and following complex directions within cell phone apps (screen shots, picture access)   - able to immediately recall up to 9 digits within one minute for carry over between apps, Independently    - recalled basic directions given from his friend prior to session but required referral back to original text message     GOAL NOT MET - however appears [facility]      Pt will complete short-term memory tasks, of increasing length and complexity, with >90% accuracy. Pt repetitive of story x2 within session without awareness. Pt recalled recommendations from MD, admission date, and tasks completed with previous SLP Independently      GOAL NOT MET - requires min-mod cues for detailed recall. Pt will complete divergent and convergent naming tasks with >90% accuracy.    Goal not targeted this session. GOAL NOT MET - Not formally targeted however no overt word finding during structured conversation. Pt will complete additional cognitive assessment with goals to be added per POC as needed. GOAL NOT MET - Additional testing was not indicated based upon accuracies with treatment tasks and results of initial assessment (CLQT). Additional formal assessment may be warranted if deficits persist s/p ETOH treatment. Other areas targeted:    Education:   Provided education re plan for d/c and functional tasks to complete at home. Safety Devices: [x] Call light within reach  [x] Chair alarm activated  [] Bed alarm activated  [] Other:     Assessment:   Speech Therapy Diagnosis  Cognitive Diagnosis: Pt presents with mild cognitive deficits primarily with short term recall and executive function tasks   Aphasia Diagnosis: Pt endorses occasional word finding difficulty in conversation, not observed during evaluation. Current Diet Order: DIET GENERAL;     Plan:   Frequency:  5days/week   30 minutes/day  Discharge Recommendations:   Barriers: Cognitive deficit  Discharge Recommendations:  [] Home independently  [x] Home with assistance []  24 hour supervision  [] SNF [] Other:  Continued SLP Treatment:  [] Yes [x] No [] TBD based on progress while on ARU [] Vital Stim indicated [x] Other: defer SLP services until completion of ETOH treatment, if memory deficits persist, consider OP speech therapy    Estimated discharge date:  4/21/20     Type of Total Treatment Minutes   Session 1     Time In 0930   Time Out 1000   Timed Code Minutes  30   Individual Treatment Minutes  30   Co-Treatment Minutes     Group Treatment Minutes     Concurrent Treatment Minutes       TOTAL DAILY MINUTES: 30    Electronically Signed by     Adelaida Pastrana M.A.  [facility] BOGDAN [id number]  Speech-Language Pathologist [phone]  4/20/2020 2:03 PM

## 2020-04-20 NOTE — PATIENT CARE CONFERENCE
[facility]  Inpatient Rehabilitation  Weekly Team Conference Note    Patient Name: Maria M Dos Santos        MRN: [id number]    : 1955  (62 y.o.)  Gender: male      Diagnosis: Debility    The team conference for this patient was held on 20 at 9:00am by:  Eladio Gomez MD    CASE MANAGEMENT:  Assessment: Patient lives in an apartment typically. No home care. [facility] [address] [place] [address] [facility]    PSYCHOLOGY:  Assessment:     PHYSICAL THERAPY:    Bed Mobility:   Scooting: Independent    Transfers:  Sit to Stand: Independent  Stand to sit: Independent  Bed to Chair: Independent    Ambulation 1  Surface: level tile  Device: Single point cane  Assistance: Independent  Quality of Gait: Slow. steady gait with decreased heel strike L>R  Distance: 440' x 1, 150'      Stairs  # Steps : 14  Rails: Bilateral  Assistance: Modified independent   Comment: Negotiated with reciprocal gait pattern, no LOB.      QM:  Roll Left and Right  Assistance Needed: Independent  CARE Score: 6  Discharge Goal: Independent  Sit to Lying  Assistance Needed: Independent  CARE Score: 6  Discharge Goal: Independent  Lying to Sitting on Side of Bed  Assistance Needed: Independent  CARE Score: 6  Discharge Goal: Independent  Sit to Stand  Assistance Needed: Independent  CARE Score: 6  Discharge Goal: Independent  Chair/Bed-to-Chair Transfer  Assistance Needed: Independent  CARE Score: 6  Discharge Goal: Independent  Car Transfer  Assistance Needed: Independent  CARE Score: 6  Discharge Goal: Independent  Walk 10 Feet  Assistance Needed: Independent  Physical Assistance Level: Less than 25%  CARE Score: 6  Discharge Goal: Partial/moderate assistance  Walk 50 Feet with Two Turns  Assistance Needed: Independent  Physical Assistance Level: Less than 25%  CARE Score: 6  Discharge Goal: Independent  Walk 150 Feet  Assistance Needed: Independent  Physical Assistance Level: Less than 25%  CARE Score: 6  Discharge Goal: Independent  Walking 10 Feet on Uneven Surfaces  Assistance Needed: Independent  Reason if not Attempted: Not attempted due to medical condition or safety concerns  CARE Score: 6  Discharge Goal: Independent  1 Step (Curb)  Assistance Needed: Independent  Physical Assistance Level: No physical assistance  CARE Score: 6  4 Steps  Assistance Needed: Independent  Physical Assistance Level: No physical assistance  Reason if not Attempted: Not attempted due to medical condition or safety concerns  CARE Score: 6  Discharge Goal: Independent  12 Steps  Assistance Needed: Independent  Physical Assistance Level: No physical assistance  Reason if not Attempted: Not attempted due to medical condition or safety concerns  CARE Score: 6  Discharge Goal: Independent  Picking Up Object  Assistance Needed: Independent  CARE Score: 6  Discharge Goal: Independent  Wheelchair Ability  Uses a Wheelchair and/or Scooter?: No    SPEECH THERAPY:    Diet Level:DIET GENERAL;    Assessment: Plan for d/c to inpt alcohol rehab tomorrow. Pt has progressed towards goals. Mild short term memory deficits persist, appear related to withdrawal therefore suggest follow up with alcohol and psychologic treatment. If deficits persist, recommend OP speech therapy.      OCCUPATIONAL THERAPY:    ADL:   ADL  Feeding: Independent  Grooming: Supervision, Setup(shaving, hair brushing, and oral care in stance at sink; SUP d/t occasional LOB- pt declined to sit for tasks)  UE Bathing: Setup, Modified independent   LE Bathing: Setup, Modified independent (primarily Anya- pt completed stance for posterior ruthy however demonstrated use of grab bar and safety during stance within shower)  UE Dressing: Modified independent   LE Dressing: Supervision(pt seated to don tensoshapes, socks, underwear and pants, stance w/ use of grab bar to don clothing over hips)  Toileting: (pt declined need)  Additional Comments: pt completed UB/LB bathing and dressing seated on shower chair w/ grab bar for stances as needed. Prior to shower pt completed shaving at sink- pt doffed LB clothing in stance w/ wall as support- pt declined recommendations to sit for safety, clothing donned post shower seated. Pt completed remainder of grooming  post dressing in stance at sink    Toilet Transfers: Toilet Transfers  Toilet - Technique: Ambulating  Equipment Used: Standard toilet  Toilet Transfer: Minimal assistance  Toilet Transfers Comments: heavy use of handrail decreased control on descent     Tub/ShowerTransfers:     Shower Transfers  Shower - Transfer From: Other(no AD)  Shower - Transfer Type: To and From  Shower - Transfer To: Shower seat with back  Shower - Technique: Ambulating  Shower Transfers: Supervision  Shower Transfers Comments: use of grab bar    QM:  Eating  Assistance Needed: Independent  CARE Score: 6  Discharge Goal: Independent  Oral Hygiene  Assistance Needed: Independent  CARE Score: 6  Discharge Goal: Independent  Toileting Hygiene  Assistance Needed: Independent  CARE Score: 6  Discharge Goal: Independent  Toilet Transfer  Assistance Needed: Independent  CARE Score: 6  Discharge Goal: Independent  Shower/Bathe Self  Assistance Needed: Supervision or touching assistance  CARE Score: 4  Discharge Goal: Independent  Upper Body Dressing  Assistance Needed: Setup or clean-up assistance  CARE Score: 5  Discharge Goal: Independent  Lower Body Dressing  Assistance Needed: Supervision or touching assistance  CARE Score: 4  Discharge Goal: Independent  Putting On/Taking Off Footwear  Assistance Needed: Setup or clean-up assistance  CARE Score: 5  Discharge Goal: Independent    NUTRITION:  Weight: 234 lb 12.8 oz (106.5 kg) / Body mass index is 28.58 kg/m². Diet Order:regular    Supplements:NA    Please see nutrition note for details.     NURSING:  FIMS:       Nida León Fall Risk Score:Medium  Wounds/Incisions/Ulcers: None  Medication Review: reviewed daily with patient  Pain: Some nueropathy pain in cinthya. feet  Consultations/Labs/X-rays: Monday and Thursday labs          Risk for Readmission: 7%  Critical Items: If High Risk, consider the following recommendations:Inpt. ETOH Rehab    Patient/Family Education provided by team:    Discharge Plan   Estimated Length of Stay: 1 day  Destination: Inpatient ETOH Rehab  Pass:No  Services at Discharge: [place] OT, S3  Equipment at Discharge: TTB, hand held shower  Factors facilitating achievement of predicted outcomes: high PLOF  Barriers to the achievement of predicted outcomes: decreased safety awareness  Patient Goals:\"to get walking better and using the cane again\", increase balance and functional mobility. Gain independence back     Rehab Team Members in attendance for Team Conference:  OLIVE Huizar, LSW  Tiffany Quiñones RD, ARMADNO [place]. D, Neuropsychologist    Damon Sharp OTR/L    Kade Macias, PT, CLT, NDT    Montrell Beckford M.A., [facility] RN    I approve the established interdisciplinary plan of care as documented within the medical record of Holli Corbin.     Erin Hager MD  Electronically signed by Erin Hager MD on 4/21/2020 at 10:02 AM

## 2020-04-21 VITALS
RESPIRATION RATE: 16 BRPM | DIASTOLIC BLOOD PRESSURE: 85 MMHG | BODY MASS INDEX: 28.59 KG/M2 | WEIGHT: 234.8 LBS | OXYGEN SATURATION: 95 % | SYSTOLIC BLOOD PRESSURE: 131 MMHG | TEMPERATURE: 97.3 F | HEART RATE: 84 BPM | HEIGHT: 76 IN

## 2020-04-21 PROCEDURE — 6370000000 HC RX 637 (ALT 250 FOR IP): Performed by: PHYSICAL MEDICINE & REHABILITATION

## 2020-04-21 RX ADMIN — Medication 100 MG: at 09:45

## 2020-04-21 RX ADMIN — HYDROXYZINE HYDROCHLORIDE 25 MG: 25 TABLET, FILM COATED ORAL at 09:44

## 2020-04-21 RX ADMIN — FOLIC ACID 1 MG: 1 TABLET ORAL at 09:44

## 2020-04-21 RX ADMIN — LISINOPRIL 20 MG: 20 TABLET ORAL at 09:45

## 2020-04-21 RX ADMIN — ASPIRIN 81 MG: 81 TABLET, COATED ORAL at 09:44

## 2020-04-21 RX ADMIN — ESCITALOPRAM OXALATE 10 MG: 10 TABLET ORAL at 09:44

## 2020-04-21 RX ADMIN — THERA TABS 1 TABLET: TAB at 09:44

## 2020-04-21 NOTE — CARE COORDINATION
Faxed BETTY and AVS to [facility] for d/c today. Their transporter will be here after 11 a.m. today for d/c.  Electronically signed by OLIVE Blackman, SALAS on 4/21/2020 at 8:36 AM

## 2020-04-21 NOTE — PROGRESS NOTES
CLINICAL PHARMACY NOTE: MEDS TO [address] [facility] Patient?: No  Total # of Prescriptions Filled: 4   The following medications were delivered to the patient:  · Escitalopram 11GA  · Folic Acid 1mg  · Vitmin B-1  · Hydroxyzine 25mg  Total # of Interventions Completed: 0  Time Spent (min): 30    Additional Documentation:    Delivered to [address] [place] [address]